# Patient Record
Sex: MALE | Race: WHITE | Employment: FULL TIME | URBAN - METROPOLITAN AREA
[De-identification: names, ages, dates, MRNs, and addresses within clinical notes are randomized per-mention and may not be internally consistent; named-entity substitution may affect disease eponyms.]

---

## 2017-01-30 ENCOUNTER — OFFICE VISIT (OUTPATIENT)
Dept: FAMILY MEDICINE CLINIC | Age: 27
End: 2017-01-30

## 2017-01-30 VITALS
WEIGHT: 210 LBS | TEMPERATURE: 98.3 F | SYSTOLIC BLOOD PRESSURE: 123 MMHG | OXYGEN SATURATION: 97 % | RESPIRATION RATE: 18 BRPM | BODY MASS INDEX: 30.06 KG/M2 | HEIGHT: 70 IN | HEART RATE: 87 BPM | DIASTOLIC BLOOD PRESSURE: 77 MMHG

## 2017-01-30 DIAGNOSIS — J06.9 VIRAL URI WITH COUGH: Primary | ICD-10-CM

## 2017-01-30 LAB
QUICKVUE INFLUENZA TEST: NEGATIVE
VALID INTERNAL CONTROL?: YES

## 2017-01-30 RX ORDER — GUAIFENESIN, PSEUDOEPHEDRINE HYDROCHLORIDE 600; 60 MG/1; MG/1
1 TABLET, EXTENDED RELEASE ORAL EVERY 12 HOURS
Qty: 14 TAB | Refills: 0 | Status: SHIPPED | OUTPATIENT
Start: 2017-01-30 | End: 2017-02-27

## 2017-01-30 NOTE — PATIENT INSTRUCTIONS
Cough: Care Instructions  Your Care Instructions  A cough is your body's response to something that bothers your throat or airways. Many things can cause a cough. You might cough because of a cold or the flu, bronchitis, or asthma. Smoking, postnasal drip, allergies, and stomach acid that backs up into your throat also can cause coughs. A cough is a symptom, not a disease. Most coughs stop when the cause, such as a cold, goes away. You can take a few steps at home to cough less and feel better. Follow-up care is a key part of your treatment and safety. Be sure to make and go to all appointments, and call your doctor if you are having problems. It's also a good idea to know your test results and keep a list of the medicines you take. How can you care for yourself at home? · Drink lots of water and other fluids. This helps thin the mucus and soothes a dry or sore throat. Honey or lemon juice in hot water or tea may ease a dry cough. · Take cough medicine as directed by your doctor. · Prop up your head on pillows to help you breathe and ease a dry cough. · Try cough drops to soothe a dry or sore throat. Cough drops don't stop a cough. Medicine-flavored cough drops are no better than candy-flavored drops or hard candy. · Do not smoke. Avoid secondhand smoke. If you need help quitting, talk to your doctor about stop-smoking programs and medicines. These can increase your chances of quitting for good. When should you call for help? Call 911 anytime you think you may need emergency care. For example, call if:  · You have severe trouble breathing. Call your doctor now or seek immediate medical care if:  · You cough up blood. · You have new or worse trouble breathing. · You have a new or higher fever. · You have a new rash.   Watch closely for changes in your health, and be sure to contact your doctor if:  · You cough more deeply or more often, especially if you notice more mucus or a change in the color of your mucus. · You have new symptoms, such as a sore throat, an earache, or sinus pain. · You do not get better as expected. Where can you learn more? Go to http://nikhil-tika.info/. Enter D279 in the search box to learn more about \"Cough: Care Instructions. \"  Current as of: May 27, 2016  Content Version: 11.1  © 1369-7719 Rent[employer]. Care instructions adapted under license by [employer] (which disclaims liability or warranty for this information). If you have questions about a medical condition or this instruction, always ask your healthcare professional. [address] any warranty or liability for your use of this information.

## 2017-01-30 NOTE — MR AVS SNAPSHOT
Visit Information Date & Time Provider Department Dept. Phone Encounter #  
 1/30/2017  8:45 AM Piper Lucio MD [address] [place] [address] [phone] [id number] Follow-up Instructions Return if symptoms worsen or fail to improve. Upcoming Health Maintenance Date Due DTaP/Tdap/Td series (1 - Tdap) 6/17/2009 INFLUENZA AGE 9 TO ADULT 8/1/2016 Allergies as of 1/30/2017  Review Complete On: 1/30/2017 By: Piper Lucio MD  
 No Known Allergies Current Immunizations  Reviewed on 1/30/2017 No immunizations on file. Reviewed by Piper Lucio MD on 1/30/2017 at  8:59 AM  
You Were Diagnosed With   
  
 Codes Comments Viral URI with cough    -  Primary ICD-10-CM: J06.9, B97.89 ICD-9-CM: 465.9 Vitals BP Pulse Temp Resp Height(growth percentile) Weight(growth percentile) 123/77 (BP 1 Location: Left arm, BP Patient Position: Sitting) 87 98.3 °F (36.8 °C) (Oral) 18 5' 10\" (1.778 m) 210 lb (95.3 kg) SpO2 BMI Smoking Status 97% 30.13 kg/m2 Never Smoker Vitals History BMI and BSA Data Body Mass Index Body Surface Area  
 30.13 kg/m 2 2.17 m 2 Preferred Pharmacy Pharmacy Name Phone [id number] [address] [facility] [phone] Your Updated Medication List  
  
   
This list is accurate as of: 1/30/17  9:26 AM.  Always use your most recent med list.  
  
  
  
  
 HYDROcodone-acetaminophen 5-325 mg per tablet Commonly known as:  Samson Crockett Take 1-2 Tabs by mouth every six (6) hours as needed for Pain. Max Daily Amount: 8 Tabs. ibuprofen 200 mg tablet Commonly known as:  MOTRIN Take 3 Tabs by mouth every six (6) hours as needed for Pain. methylphenidate 36 mg CR tablet Commonly known as:  CONCERTA Take 1 Tab by mouth daily. Max Daily Amount: 36 mg. 2 tabs in am daily PSEUDOEPHEDRINE-guaiFENesin  mg per tablet Commonly known as:  Rishi Husk D Take 1 Tab by mouth every twelve (12) hours. traMADol 50 mg tablet Commonly known as:  ULTRAM  
Take 1 Tab by mouth every six (6) hours as needed for Pain. Max Daily Amount: 200 mg.  
  
 TUMS EXTRA STRENGTH SMOOTHIES 300 mg (750 mg) chewable tablet Generic drug:  calcium carbonate Take 1 Tab by mouth daily. Prescriptions Sent to Pharmacy Refills PSEUDOEPHEDRINE-guaiFENesin (MUCINEX D)  mg per tablet 0 Sig: Take 1 Tab by mouth every twelve (12) hours. Class: Normal  
 Pharmacy: [address] [place] [address] Ph #: [phone] Route: Oral  
  
We Performed the Following AMB POC RAPID INFLUENZA TEST [59441 CPT(R)] Follow-up Instructions Return if symptoms worsen or fail to improve. Patient Instructions Cough: Care Instructions Your Care Instructions A cough is your body's response to something that bothers your throat or airways. Many things can cause a cough. You might cough because of a cold or the flu, bronchitis, or asthma. Smoking, postnasal drip, allergies, and stomach acid that backs up into your throat also can cause coughs. A cough is a symptom, not a disease. Most coughs stop when the cause, such as a cold, goes away. You can take a few steps at home to cough less and feel better. Follow-up care is a key part of your treatment and safety. Be sure to make and go to all appointments, and call your doctor if you are having problems. It's also a good idea to know your test results and keep a list of the medicines you take. How can you care for yourself at home? · Drink lots of water and other fluids. This helps thin the mucus and soothes a dry or sore throat. Honey or lemon juice in hot water or tea may ease a dry cough. · Take cough medicine as directed by your doctor. · Prop up your head on pillows to help you breathe and ease a dry cough. · Try cough drops to soothe a dry or sore throat. Cough drops don't stop a cough. Medicine-flavored cough drops are no better than candy-flavored drops or hard candy. · Do not smoke. Avoid secondhand smoke. If you need help quitting, talk to your doctor about stop-smoking programs and medicines. These can increase your chances of quitting for good. When should you call for help? Call 911 anytime you think you may need emergency care. For example, call if: 
· You have severe trouble breathing. Call your doctor now or seek immediate medical care if: 
· You cough up blood. · You have new or worse trouble breathing. · You have a new or higher fever. · You have a new rash. Watch closely for changes in your health, and be sure to contact your doctor if: 
· You cough more deeply or more often, especially if you notice more mucus or a change in the color of your mucus. · You have new symptoms, such as a sore throat, an earache, or sinus pain. · You do not get better as expected. Where can you learn more? Go to http://nikhil-tika.info/. Enter D279 in the search box to learn more about \"Cough: Care Instructions. \" Current as of: May 27, 2016 Content Version: 11.1 © 7555-2055 [employer]. Care instructions adapted under license by [employer] (which disclaims liability or warranty for this information). If you have questions about a medical condition or this instruction, always ask your healthcare professional. [place] [address] any warranty or liability for your use of this information. Introducing [facility] HE[facility]! [employer] introduces [employer] patient portal. Now you can access parts of your medical record, email your doctor's office, and request medication refills online. 1. In your internet browser, go to [email or web address] 2. Click on the First Time User? Click Here link in the Sign In box. You will see the New Member Sign Up page. 3. Enter your SocialTagg Access Code exactly as it appears below. You will not need to use this code after youve completed the sign-up process. If you do not sign up before the expiration date, you must request a new code. · SocialTagg Access Code: [email or web address] Expires: 4/30/2017  9:02 AM 
 
4. Enter the last four digits of your Social Security Number (xxxx) and Date of Birth (mm/dd/yyyy) as indicated and click Submit. You will be taken to the next sign-up page. 5. Create a SocialTagg ID. This will be your SocialTagg login ID and cannot be changed, so think of one that is secure and easy to remember. 6. Create a SocialTagg password. You can change your password at any time. 7. Enter your Password Reset Question and Answer. This can be used at a later time if you forget your password. 8. Enter your e-mail address. You will receive e-mail notification when new information is available in [address] 9. Click Sign Up. You can now view and download portions of your medical record. 10. Click the Download Summary menu link to download a portable copy of your medical information. If you have questions, please visit the Frequently Asked Questions section of the SocialTagg website. Remember, SocialTagg is NOT to be used for urgent needs. For medical emergencies, dial 911. Now available from your iPhone and Android! Please provide this summary of care documentation to your next provider. Your primary care clinician is listed as Dori Shafer. If you have any questions after today's visit, please call [phone].

## 2017-01-30 NOTE — PROGRESS NOTES
Tien Mccracken is an 27 y.o. male who presents for cough. The cough started 5 days ago, productive with small amount of clear sputum, nasal congestion, sore throat as well. Also had subjective fever and chills 3 days ago. Denies ear pain, headache, nausea, vomiting, rash. Tried Mucinex Dm and Advil with minimal relief. Non smoker. No hx of asthma. Works as a teacher. Many sick contacts around. No Flu shot this year. Allergies - reviewed:   No Known Allergies      Medications - reviewed:   Current Outpatient Prescriptions   Medication Sig    PSEUDOEPHEDRINE-guaiFENesin (MUCINEX D)  mg per tablet Take 1 Tab by mouth every twelve (12) hours.  calcium carbonate (TUMS EXTRA STRENGTH SMOOTHIES) 300 mg (750 mg) chewable tablet Take 1 Tab by mouth daily.  methylphenidate ER 36 mg 24 hr tab Take 1 Tab by mouth daily. Max Daily Amount: 36 mg. 2 tabs in am daily    HYDROcodone-acetaminophen (NORCO) 5-325 mg per tablet Take 1-2 Tabs by mouth every six (6) hours as needed for Pain. Max Daily Amount: 8 Tabs.  ibuprofen (MOTRIN) 200 mg tablet Take 3 Tabs by mouth every six (6) hours as needed for Pain.  traMADol (ULTRAM) 50 mg tablet Take 1 Tab by mouth every six (6) hours as needed for Pain. Max Daily Amount: 200 mg. No current facility-administered medications for this visit. Past Medical History - reviewed:  Past Medical History   Diagnosis Date    ADHD (attention deficit hyperactivity disorder) 1997     neuro-evaluation done. on concerta.  ADHD (attention deficit hyperactivity disorder)          Past Surgical History - reviewed:   Past Surgical History   Procedure Laterality Date    Hx wisdom teeth extraction           Social History - reviewed:  Social History     Social History    Marital status:      Spouse name: N/A    Number of children: N/A    Years of education: N/A     Occupational History    Not on file.      Social History Main Topics    Smoking status: Never Smoker    Smokeless tobacco: Never Used    Alcohol use 0.6 - 1.2 oz/week     1 - 2 Cans of beer per week    Drug use: No    Sexual activity: Yes     Partners: Female     Other Topics Concern    Not on file     Social History Narrative         Family History - reviewed:  Family History   Problem Relation Age of Onset    Tremors Mother     Arthritis-osteo Mother     Cancer Father     Lung Disease Father     Elevated Lipids Maternal Grandmother     Diabetes Maternal Grandmother     Arthritis-osteo Maternal Grandmother     Stroke Maternal Grandfather     Arthritis-osteo Paternal Grandmother     Elevated Lipids Paternal Grandfather          Immunizations - reviewed: There is no immunization history on file for this patient. ROS  Review of Systems : negative unless highlighted  CONSTITUTIONAL: fevers. Chills. Anorexia. Weight loss. Weight gain. EYES: diplopia. Blurry vision. Visual changes  ENT: sinus congestion. Cough. Sore throat. Rhinorrhea. CARDIOVASCULAR: chest pain. palpitations  RESPIRATORY: dyspnea. Cough. Wheeze. SKIN: rash. Itching. Dryness. Flushing. Physical Exam  Visit Vitals    /77 (BP 1 Location: Left arm, BP Patient Position: Sitting)    Pulse 87    Temp 98.3 °F (36.8 °C) (Oral)    Resp 18    Ht 5' 10\" (1.778 m)    Wt 210 lb (95.3 kg)    SpO2 97%    BMI 30.13 kg/m2       General appearance - NAD. Appropriately conversational, intermittently coughing during the conversation. HENT: TMs clear, external canals without redness. Oral mucosa moist. + Postnasal drainage. Throat slightly erythematous. Neck: Supple. No lymphadenopathy. Respiratory - LCTAB. No wheeze/rale/rhonchi  Heart - Normal rate, regular rhythm. No m/r/r  Skin - normal coloration and normal turgor. No cyanosis, no rash.        Assessment/Plan    Viral URI with cough  - Rx for Mucinex D was given  -Encouraged oral hydration with warm tea  -The pt was reassured about having viral infection instead of bactearial      Rapid influenza test is negative    Follow-up Disposition:  Return if symptoms worsen or fail to improve. I discussed the aforementioned diagnoses with the patient as well as the plan of care.      The patient was seen and discussed with Dr. Lizbeth Marsh ( The attending physician)    Fei Simpson MD  Family Medicine Resident  PGY 1

## 2017-01-30 NOTE — PROGRESS NOTES
Chief Complaint   Patient presents with    Chills     chills x 2 days     Cough     cough x 5 days      1. Have you been to the ER, urgent care clinic since your last visit? Hospitalized since your last visit? No    2. Have you seen or consulted any other health care providers outside of the [address] [place] [address] since your last visit? Include any pap smears or colon screening. No     Reviewed record in preparation for visit and have obtained necessary documentation.

## 2017-02-27 ENCOUNTER — OFFICE VISIT (OUTPATIENT)
Dept: FAMILY MEDICINE CLINIC | Age: 27
End: 2017-02-27

## 2017-02-27 VITALS
HEART RATE: 100 BPM | WEIGHT: 204.6 LBS | HEIGHT: 70 IN | RESPIRATION RATE: 16 BRPM | DIASTOLIC BLOOD PRESSURE: 81 MMHG | SYSTOLIC BLOOD PRESSURE: 126 MMHG | BODY MASS INDEX: 29.29 KG/M2 | OXYGEN SATURATION: 96 % | TEMPERATURE: 98.5 F

## 2017-02-27 DIAGNOSIS — J06.9 VIRAL URI WITH COUGH: ICD-10-CM

## 2017-02-27 DIAGNOSIS — F90.9 ADULT ADHD: Primary | ICD-10-CM

## 2017-02-27 RX ORDER — METHYLPHENIDATE HYDROCHLORIDE 36 MG/1
72 TABLET ORAL DAILY
Qty: 60 TAB | Refills: 0 | Status: SHIPPED | OUTPATIENT
Start: 2017-02-27 | End: 2017-02-27

## 2017-02-27 RX ORDER — METHYLPHENIDATE HYDROCHLORIDE 36 MG/1
72 TABLET ORAL DAILY
Qty: 60 TAB | Refills: 0 | Status: SHIPPED | OUTPATIENT
Start: 2017-03-30 | End: 2017-04-29

## 2017-02-27 RX ORDER — METHYLPHENIDATE HYDROCHLORIDE 36 MG/1
72 TABLET ORAL DAILY
Qty: 60 TAB | Refills: 0 | Status: SHIPPED | OUTPATIENT
Start: 2017-02-27 | End: 2017-03-29

## 2017-02-27 RX ORDER — BENZONATATE 200 MG/1
200 CAPSULE ORAL
Qty: 21 CAP | Refills: 1 | Status: SHIPPED | OUTPATIENT
Start: 2017-02-27 | End: 2017-03-06

## 2017-02-27 RX ORDER — METHYLPHENIDATE HYDROCHLORIDE 36 MG/1
72 TABLET ORAL DAILY
Qty: 60 TAB | Refills: 0 | Status: SHIPPED | OUTPATIENT
Start: 2017-04-30 | End: 2017-05-30

## 2017-02-27 NOTE — PROGRESS NOTES
Chief Complaint   Patient presents with    Medication Refill     concerta    Cough     for a month

## 2017-02-27 NOTE — MR AVS SNAPSHOT
Visit Information Date & Time Provider Department Dept. Phone Encounter #  
 2/27/2017  3:50 PM Dori Shafer MD [facility] [phone] [id number] Upcoming Health Maintenance Date Due DTaP/Tdap/Td series (1 - Tdap) 6/17/2009 INFLUENZA AGE 9 TO ADULT 8/1/2016 Allergies as of 2/27/2017  Review Complete On: 2/27/2017 By: Dori Shafer MD  
 No Known Allergies Current Immunizations  Reviewed on 1/30/2017 No immunizations on file. Not reviewed this visit You Were Diagnosed With   
  
 Codes Comments Adult ADHD    -  Primary ICD-10-CM: F90.0 ICD-9-CM: 314.01 Viral URI with cough     ICD-10-CM: J06.9, B97.89 ICD-9-CM: 465.9 Vitals BP  
  
  
  
  
  
 126/81 (BP 1 Location: Right arm, BP Patient Position: Sitting) Vitals History BMI and BSA Data Body Mass Index Body Surface Area  
 29.36 kg/m 2 2.14 m 2 Preferred Pharmacy Pharmacy Name Phone [id number] [place] [facility] [phone] Your Updated Medication List  
  
   
This list is accurate as of: 2/27/17  4:30 PM.  Always use your most recent med list.  
  
  
  
  
 benzonatate 200 mg capsule Commonly known as:  TESSALON Take 1 Cap by mouth three (3) times daily as needed for Cough for up to 7 days. ibuprofen 200 mg tablet Commonly known as:  MOTRIN Take 3 Tabs by mouth every six (6) hours as needed for Pain. * methylphenidate 36 mg CR tablet Commonly known as:  CONCERTA Take 2 Tabs (72 mg total) by mouth daily. 2 tabs in am daily. Max Daily Amount: 72 mg  
  
 * methylphenidate 36 mg CR tablet Commonly known as:  CONCERTA Take 2 Tabs (72 mg total) by mouth dailyEarliest Fill Date: 3/30/17. Max Daily Amount: 72 mg Start taking on:  3/30/2017 * methylphenidate 36 mg CR tablet Commonly known as:  CONCERTA Take 2 Tabs (72 mg total) by mouth dailyEarliest Fill Date: 4/30/17. Max Daily Amount: 72 mg Start taking on:  4/30/2017 PSEUDOEPHEDRINE-guaiFENesin  mg per tablet Commonly known as:  Jodie Jqauelin D Take 1 Tab by mouth every twelve (12) hours. TUMS EXTRA STRENGTH SMOOTHIES 300 mg (750 mg) chewable tablet Generic drug:  calcium carbonate Take 1 Tab by mouth daily. * Notice: This list has 3 medication(s) that are the same as other medications prescribed for you. Read the directions carefully, and ask your doctor or other care provider to review them with you. Prescriptions Printed Refills  
 methylphenidate ER 36 mg 24 hr tab 0 Sig: Take 2 Tabs (72 mg total) by mouth daily. 2 tabs in am daily. Max Daily Amount: 72 mg Class: Print Route: Oral  
 methylphenidate ER 36 mg 24 hr tab 0 Starting on: 3/30/2017 Sig: Take 2 Tabs (72 mg total) by mouth dailyEarliest Fill Date: 3/30/17. Max Daily Amount: 72 mg Class: Print Route: Oral  
 methylphenidate ER 36 mg 24 hr tab 0 Starting on: 4/30/2017 Sig: Take 2 Tabs (72 mg total) by mouth dailyEarliest Fill Date: 4/30/17. Max Daily Amount: 72 mg Class: Print Route: Oral  
  
Prescriptions Sent to Pharmacy Refills  
 benzonatate (TESSALON) 200 mg capsule 1 Sig: Take 1 Cap by mouth three (3) times daily as needed for Cough for up to 7 days. Class: Normal  
 Pharmacy: [address] [place] [address] Ph #: [phone] Route: Oral  
  
Patient Instructions Benzonatate (By mouth) Benzonatate (dci-MPZ-gf-de la fuente) Treats cough. Brand Name(s):Manjinder Ball There may be other brand names for this medicine. When This Medicine Should Not Be Used: You should not use this medicine if you have had an allergic reaction to benzonatate in the past.  
How to Use This Medicine:  
Capsule, Liquid Filled Capsule · Your doctor will tell you how much medicine to take and how often. · Swallow the capsules whole; do not crush or chew. Chewing the capsule may numb your mouth and throat and you could choke. If a dose is missed: · Take the missed dose as soon as possible. · If it is almost time for the next dose, wait until then to take your medicine and skip the missed dose. · You should not use two doses at the same time. How to Store and Dispose of This Medicine:  
· Keep this medication in the original tightly closed, light-resistant container. · Store at room temperature, away from heat, moisture, and light. · Ask your pharmacist, doctor, or health caregiver about the best way to dispose of any outdated medicine or medicine no longer needed. · Keep all medicine away from children. Drugs and Foods to Avoid: Ask your doctor or pharmacist before using any other medicine, including over-the-counter medicines, vitamins, and herbal products. · Avoid drinking alcohol while taking this medicine. Warnings While Using This Medicine: · If you are pregnant or breastfeeding, talk to your doctor before taking this medicine. · Benzonatate is not recommended for children younger than 8years old. Possible Side Effects While Using This Medicine:  
Call your doctor right away if you notice any of these side effects: · Trouble breathing · Tightness in the chest or throat · Tremors, shakiness, seizures · Skin rash, itching If you notice these less serious side effects, talk with your doctor: · Nausea, upset stomach 
· Headache · Mild dizziness · Drowsiness · Constipation · Stuffy nose If you notice other side effects that you think are caused by this medicine, tell your doctor. Call your doctor for medical advice about side effects. You may report side effects to FDA at 4-545-FDA-3445 © 2016 [address] is for End User's use only and may not be sold, redistributed or otherwise used for commercial purposes. The above information is an  only. It is not intended as medical advice for individual conditions or treatments. Talk to your doctor, nurse or pharmacist before following any medical regimen to see if it is safe and effective for you. Introducing [facility] & HEALTH SERVICES! Clarisa Neal introduces Vendor[employer] patient portal. Now you can access parts of your medical record, email your doctor's office, and request medication refills online. 1. In your internet browser, go to [email or web address] 2. Click on the First Time User? Click Here link in the Sign In box. You will see the New Member Sign Up page. 3. Enter your VendorStack Access Code exactly as it appears below. You will not need to use this code after youve completed the sign-up process. If you do not sign up before the expiration date, you must request a new code. · VendorStack Access Code: [email or web address] Expires: 4/30/2017  9:02 AM 
 
4. Enter the last four digits of your Social Security Number (xxxx) and Date of Birth (mm/dd/yyyy) as indicated and click Submit. You will be taken to the next sign-up page. 5. Create a VendorStack ID. This will be your VendorStack login ID and cannot be changed, so think of one that is secure and easy to remember. 6. Create a VendorStack password. You can change your password at any time. 7. Enter your Password Reset Question and Answer. This can be used at a later time if you forget your password. 8. Enter your e-mail address. You will receive e-mail notification when new information is available in [address] 9. Click Sign Up. You can now view and download portions of your medical record. 10. Click the Download Summary menu link to download a portable copy of your medical information.  
 
If you have questions, please visit the Frequently Asked Questions section of the [employer]. Remember, [employer]rus Lane[employer] is NOT to be used for urgent needs. For medical emergencies, dial 911. Now available from your iPhone and Android! Please provide this summary of care documentation to your next provider. Your primary care clinician is listed as Gonzalo Trujillo. If you have any questions after today's visit, please call [phone].

## 2017-02-27 NOTE — PROGRESS NOTES
Subjective:      Addy Rubio is an 27 y.o. male who presents for evaluation of ADD and cough. ADD  Working as a teacher. Symptoms well controlled.   States that he has good appetite, no palpitations, sleeps well  He has been taking concerta 83BS every day. He signed up with our clinic for medication contract. Cough  Onset: 3-4 weeks ago  Frequency : intermittent  Progress : improved  Accompanied Symptoms : no  Pain : no  Tried mucinex and sudafed but not helpful for coughing. Review of Systems   Constitutional: Negative for chills, fever, malaise/fatigue and weight loss. HENT: Negative for ear pain. Eyes: Negative for blurred vision. Respiratory: Positive for cough. Negative for sputum production and shortness of breath. Cardiovascular: Negative for chest pain. Skin: Negative for itching and rash. Neurological: Negative for headaches. Past Medical History:   Diagnosis Date    ADHD (attention deficit hyperactivity disorder) 1997    neuro-evaluation done. on concerta.  ADHD (attention deficit hyperactivity disorder)      Past Surgical History:   Procedure Laterality Date    HX WISDOM TEETH EXTRACTION       Current Outpatient Prescriptions   Medication Sig Dispense Refill    calcium carbonate (TUMS EXTRA STRENGTH SMOOTHIES) 300 mg (750 mg) chewable tablet Take 1 Tab by mouth daily.  methylphenidate ER 36 mg 24 hr tab Take 1 Tab by mouth daily. Max Daily Amount: 36 mg. 2 tabs in am daily 60 Tab 2    PSEUDOEPHEDRINE-guaiFENesin (MUCINEX D)  mg per tablet Take 1 Tab by mouth every twelve (12) hours. 14 Tab 0    ibuprofen (MOTRIN) 200 mg tablet Take 3 Tabs by mouth every six (6) hours as needed for Pain.  90 Tab 1     No Known Allergies  Family History   Problem Relation Age of Onset    Tremors Mother     Arthritis-osteo Mother     Cancer Father     Lung Disease Father     Elevated Lipids Maternal Grandmother     Diabetes Maternal Grandmother     Elena Monroy Maternal Grandmother     Stroke Maternal Grandfather     Arthritis-osteo Paternal Grandmother     Elevated Lipids Paternal Grandfather      Social History     Social History    Marital status:      Spouse name: N/A    Number of children: N/A    Years of education: N/A     Occupational History    Not on file. Social History Main Topics    Smoking status: Never Smoker    Smokeless tobacco: Never Used    Alcohol use 0.6 - 1.2 oz/week     1 - 2 Cans of beer per week    Drug use: No    Sexual activity: Yes     Partners: Female     Other Topics Concern    Not on file     Social History Narrative           Objective:     Visit Vitals    /81 (BP 1 Location: Right arm, BP Patient Position: Sitting)    Pulse 100    Temp 98.5 °F (36.9 °C) (Oral)    Resp 16    Ht 5' 10\" (1.778 m)    Wt 204 lb 9.6 oz (92.8 kg)    SpO2 96%    BMI 29.36 kg/m2         Physical Exam:  General: calm, relaxed, appears nondistressed   Skin: no rashes or lesions noted, no erythema, ecchymoses, or petechiae, no urticaria   HEENT:  normocephalic/atraumatic, no facial droop, PERLLA/EOMI, pharynx clear,  speech clear and fluent. Conjunctivae/Cornea clear. Neck:  supple, no meningismus, no thyromegaly or thyroid tenderness, no detectable cervical bruit, and no JVD  Supple, FROM, normal appearance, symmetrical, trachea midline. No palpable adenopathy   Chest Wall: no tenderness or deformity. Lungs:  normal symmetric expansion - clear to auscultation, no stridor, rales, rhonchi, or wheezes heard   Cardiac:  regular rate and rhythm, normal S1/S2, no obvious murmur, gallop, or rub, PMI appears nondisplaced         Assessment / Plan:     1. Adult ADHD  Will refill concerta today. Recommend to follow up with neuropsych to re-evaluate. - methylphenidate ER 36 mg 24 hr tab; Take 2 Tabs (72 mg total) by mouth daily. 2 tabs in am daily. Max Daily Amount: 72 mg  Dispense: 60 Tab; Refill: 0    2.  Viral URI with cough  Symptomatic treatment. Lung sound clear on exam. Afebrile. - benzonatate (TESSALON) 200 mg capsule; Take 1 Cap by mouth three (3) times daily as needed for Cough for up to 7 days. Dispense: 21 Cap; Refill: 1      AVS was printed, given to patient and briefly discussed prior to patient's departure from the office today.      Poonam Krueger MD  [facility] Medicine Resident  [address] [place] [address]  Madelyn Vaz

## 2017-10-13 ENCOUNTER — OFFICE VISIT (OUTPATIENT)
Dept: FAMILY MEDICINE CLINIC | Age: 27
End: 2017-10-13

## 2017-10-13 VITALS
HEIGHT: 70 IN | WEIGHT: 214 LBS | OXYGEN SATURATION: 96 % | SYSTOLIC BLOOD PRESSURE: 132 MMHG | HEART RATE: 78 BPM | DIASTOLIC BLOOD PRESSURE: 82 MMHG | BODY MASS INDEX: 30.64 KG/M2 | RESPIRATION RATE: 16 BRPM | TEMPERATURE: 99 F

## 2017-10-13 DIAGNOSIS — F90.2 ATTENTION DEFICIT HYPERACTIVITY DISORDER (ADHD), COMBINED TYPE: Primary | ICD-10-CM

## 2017-10-13 RX ORDER — METHYLPHENIDATE HYDROCHLORIDE 36 MG/1
36 TABLET ORAL
Qty: 30 TAB | Refills: 0 | Status: SHIPPED | OUTPATIENT
Start: 2017-12-08 | End: 2018-01-09 | Stop reason: SDUPTHER

## 2017-10-13 RX ORDER — METHYLPHENIDATE HYDROCHLORIDE 36 MG/1
36 TABLET ORAL
COMMUNITY
End: 2017-10-13 | Stop reason: SDUPTHER

## 2017-10-13 RX ORDER — METHYLPHENIDATE HYDROCHLORIDE 36 MG/1
36 TABLET ORAL
Qty: 30 TAB | Refills: 0 | Status: SHIPPED | OUTPATIENT
Start: 2017-11-10 | End: 2017-10-13 | Stop reason: SDUPTHER

## 2017-10-13 RX ORDER — METHYLPHENIDATE HYDROCHLORIDE 36 MG/1
36 TABLET ORAL
Qty: 30 TAB | Refills: 0 | Status: SHIPPED | OUTPATIENT
Start: 2017-10-13 | End: 2017-10-13 | Stop reason: SDUPTHER

## 2017-10-13 NOTE — MR AVS SNAPSHOT
Visit Information Date & Time Provider Department Dept. Phone Encounter #  
 10/13/2017 10:20 AM Charlotte Hernandez, [facility] [phone] [id number] Upcoming Health Maintenance Date Due DTaP/Tdap/Td series (1 - Tdap) 6/17/2009 INFLUENZA AGE 9 TO ADULT 8/1/2017 Allergies as of 10/13/2017  Review Complete On: 10/13/2017 By: Gil Diaz LPN No Known Allergies Current Immunizations  Reviewed on 1/30/2017 No immunizations on file. Not reviewed this visit You Were Diagnosed With   
  
 Codes Comments Attention deficit hyperactivity disorder (ADHD), combined type    -  Primary ICD-10-CM: F90.2 ICD-9-CM: 314.01 Vitals BP Pulse Temp Resp Height(growth percentile) Weight(growth percentile) 132/82 (BP 1 Location: Left arm, BP Patient Position: Sitting) 78 99 °F (37.2 °C) (Oral) 16 5' 10\" (1.778 m) 214 lb (97.1 kg) SpO2 BMI Smoking Status 96% 30.71 kg/m2 Never Smoker Vitals History BMI and BSA Data Body Mass Index Body Surface Area 30.71 kg/m 2 2.19 m 2 Preferred Pharmacy Pharmacy Name Phone [id number] [facility] [phone] Your Updated Medication List  
  
   
This list is accurate as of: 10/13/17 10:43 AM.  Always use your most recent med list.  
  
  
  
  
 ibuprofen 200 mg tablet Commonly known as:  MOTRIN Take 3 Tabs by mouth every six (6) hours as needed for Pain. methylphenidate HCl 36 mg CR tablet Commonly known as:  CONCERTA Take 1 Tab (36 mg total) by mouth every morningEarliest Fill Date: 12/8/17. Max Daily Amount: 36 mg  
Start taking on:  12/8/2017 TUMS EXTRA STRENGTH SMOOTHIES 300 mg (750 mg) chewable tablet Generic drug:  calcium carbonate Take 1 Tab by mouth daily. Prescriptions Printed  Refills  
 methylphenidate ER 36 mg 24 hr tab 0  
 Starting on: 12/8/2017 Sig: Take 1 Tab (36 mg total) by mouth every morningEarliest Fill Date: 12/8/17. Max Daily Amount: 36 mg  
 Class: Print Route: Oral  
  
Patient Instructions Please establish with a counselor for further refills. Have information faxed to our office. [address] [address] [address] [place], 
[address] [place] [address] Avenue 
[phone] Dr. Kristen Mnoge [facility] [place] [address] [facility] [address] [facility] [address] [facility] Phone: [phone] Fax: [phone] Introducing [facility]! Dear [employer]: Thank you for requesting a [employer] account. Our records indicate that you already have an active [employer] account. You can access your account anytime at [email or web address],". [employer]/"[employer]," Did you know that you can access your hospital and ER discharge instructions at any time in [employer]? You can also review all of your test results from your hospital stay or ER visit. Additional Information If you have questions, please visit the Frequently Asked Questions section of the [employer] website at [email or web address],". [employer]/"[employer],"/. Remember, [employer] is NOT to be used for urgent needs. For medical emergencies, dial 911. Now available from your iPhone and Android! Please provide this summary of care documentation to your next provider. Your primary care clinician is listed as Smita Patel. If you have any questions after today's visit, please call [phone].

## 2017-10-13 NOTE — PATIENT INSTRUCTIONS
Please establish with a counselor for further refills. Have information faxed to our office. [place] [employer]    [place] [address]  [address] [facility] [address]  [phone]    Dr. Halima Steel [facility]  [facility] [address]  Phone: [phone]  Fax: [phone]

## 2017-10-13 NOTE — PROGRESS NOTES
Subjective  Dariana Alvarez is an 32 y.o. male who presents for methylphenidate refill for ADHD. He has been seen at our office for the same. He denies seeking therapy or counseling to help manage symptoms of inattentiveness or hyperactivity. Diagnosed at 6years old. Has been on several medications but methylphenidate has worked well for several years. Has no long term goals in place to wean off the medication. Past Medical History - reviewed:  Past Medical History:   Diagnosis Date    ADHD (attention deficit hyperactivity disorder) 1997    neuro-evaluation done. on concerta.  ADHD (attention deficit hyperactivity disorder)          ROS  CONSTITUTIONAL: no fever  CARDIOVASCULAR: no chest pain  RESPIRATORY: no shortness of breath      Physical Exam  Visit Vitals    /82 (BP 1 Location: Left arm, BP Patient Position: Sitting)    Pulse 78    Temp 99 °F (37.2 °C) (Oral)    Resp 16    Ht 5' 10\" (1.778 m)    Wt 214 lb (97.1 kg)    SpO2 96%    BMI 30.71 kg/m2       General appearance - alert, well appearing, and in no distress  Chest - clear to auscultation, no wheezes or rhonchi, symmetric air entry  Heart - normal rate, regular rhythm, normal S1, S2, no murmurs  Abdomen - soft, nontender, nondistended  Neurological - alert, oriented, normal speech, no focal findings or movement disorder noted  Skin - normal coloration and turgor      Assessment/Plan    ICD-10-CM ICD-9-CM    1. Attention deficit hyperactivity disorder (ADHD), combined type F90.2 314.01 REFERRAL TO NEUROPSYCHOLOGY     ADHD: [facility]  consistent. Gave 3 month supply. Counseled on establishing long term goals. AVS information with counselors and Neuropsych follow up. Advised patient to see counselor and have information faxed to our office. Patient informed that further refills will not be provided unless appropriate follow up is initiated. He is in agreement.     Follow-up Disposition:  Return in about 3 months (around 1/13/2018). I have discussed the diagnosis with the patient and the intended plan as seen in the above orders. The patient has received an after-visit summary and questions were answered concerning future plans. I have discussed medication side effects and warnings with the patient as well.       Lorene Garcia MD  Family Medicine Resident

## 2018-01-08 ENCOUNTER — TELEPHONE (OUTPATIENT)
Dept: FAMILY MEDICINE CLINIC | Age: 28
End: 2018-01-08

## 2018-01-08 NOTE — TELEPHONE ENCOUNTER
Patient states he have always taken 2 pills a day and not 1 pill. Patient states he was given a 30 day supply and need to be 60 day supply.       methylphenidate ER 36 mg 24 hr tab [[id number]]   Order Details   Dose: 36 mg Route: Oral Frequency: 7AM   Dispense Quantity:  30 Tab Refills:  0 Fills Remaining:  --           Sig: Take 1 Tab (36 mg total) by mouth every morningEarliest Fill Date: 12/8/17.  Max Daily Amount: 36 mg          Written Date:  10/13/17 Expiration Date:  --     Start Date:  12/08/17 End Date:  --            Ordering Provider:  -- RANI #:  -- NPI:  --    Authorizing Provider:  Jose Maria Aleman MD RANI #:  [id number] NPI:  [id number]    Ordering User:  Jose Maria Aleman MD               Original Order:  methylphenidate ER 36 mg 24 hr tab [[id number]]      Pharmacy:  [facility] [place] #:  [id number]     Pharmacy Comments:  --          Quantity Remaining:  -- Quantity Filled:  --

## 2018-01-09 DIAGNOSIS — F90.9 ATTENTION DEFICIT HYPERACTIVITY DISORDER (ADHD), UNSPECIFIED ADHD TYPE: Primary | ICD-10-CM

## 2018-01-09 RX ORDER — METHYLPHENIDATE HYDROCHLORIDE 36 MG/1
72 TABLET ORAL DAILY
Qty: 60 TAB | Refills: 0 | Status: SHIPPED | OUTPATIENT
Start: 2018-01-09 | End: 2018-09-04 | Stop reason: SDUPTHER

## 2018-01-09 RX ORDER — METHYLPHENIDATE HYDROCHLORIDE 36 MG/1
72 TABLET ORAL DAILY
Qty: 60 TAB | Refills: 0 | Status: SHIPPED | OUTPATIENT
Start: 2018-02-09 | End: 2018-02-05 | Stop reason: SDUPTHER

## 2018-02-05 ENCOUNTER — OFFICE VISIT (OUTPATIENT)
Dept: FAMILY MEDICINE CLINIC | Age: 28
End: 2018-02-05

## 2018-02-05 VITALS
OXYGEN SATURATION: 97 % | RESPIRATION RATE: 20 BRPM | WEIGHT: 222.8 LBS | TEMPERATURE: 102.9 F | BODY MASS INDEX: 31.9 KG/M2 | DIASTOLIC BLOOD PRESSURE: 86 MMHG | HEIGHT: 70 IN | SYSTOLIC BLOOD PRESSURE: 125 MMHG | HEART RATE: 100 BPM

## 2018-02-05 DIAGNOSIS — J02.9 SORE THROAT: ICD-10-CM

## 2018-02-05 DIAGNOSIS — R50.9 FEVER, UNSPECIFIED FEVER CAUSE: ICD-10-CM

## 2018-02-05 DIAGNOSIS — R52 BODY ACHES: ICD-10-CM

## 2018-02-05 DIAGNOSIS — J06.9 UPPER RESPIRATORY TRACT INFECTION, UNSPECIFIED TYPE: Primary | ICD-10-CM

## 2018-02-05 RX ORDER — OSELTAMIVIR PHOSPHATE 75 MG/1
75 CAPSULE ORAL 2 TIMES DAILY
Qty: 10 CAP | Refills: 0 | Status: SHIPPED | OUTPATIENT
Start: 2018-02-05 | End: 2018-02-10

## 2018-02-05 NOTE — MR AVS SNAPSHOT
[address] 
[phone] Patient: Karly Kang MRN: [id number] JEQ:[id number] Visit Information Date & Time Provider Department Dept. Phone Encounter #  
 2/5/2018  3:05 PM Talya Richard MD [address] [place] [address] [phone] [id number] Follow-up Instructions Return if symptoms worsen or fail to improve. Your Appointments 7/2/2018 10:40 AM  
New Patient with Mahogany Spencer PsyD [address] [facility] ([address]) Appt Note: evaluate for ADHD ref by Dr. Alisa Guzman [phone]) $40CP cc yd 01/08/18 [place] [address] [facility] [address] [facility] [address] [id number] [phone]  
  
   
 [place] [address] [id number] [address] Upcoming Health Maintenance Date Due DTaP/Tdap/Td series (1 - Tdap) 6/17/2009 Influenza Age 5 to Adult 8/1/2017 Allergies as of 2/5/2018  Review Complete On: 2/5/2018 By: Talya Richard MD  
 No Known Allergies Current Immunizations  Reviewed on 1/30/2017 No immunizations on file. Not reviewed this visit You Were Diagnosed With   
  
 Codes Comments Upper respiratory tract infection, unspecified type    -  Primary ICD-10-CM: J06.9 ICD-9-CM: 465.9 Sore throat     ICD-10-CM: J02.9 ICD-9-CM: 743 Body aches     ICD-10-CM: R52 ICD-9-CM: 780.96 Fever, unspecified fever cause     ICD-10-CM: R50.9 ICD-9-CM: 780.60 Vitals BP Pulse Temp Resp Height(growth percentile) Weight(growth percentile) 125/86 (BP 1 Location: Left arm, BP Patient Position: Sitting) 100 (!) 102.9 °F (39.4 °C) (Oral) 20 5' 10\" (1.778 m) 222 lb 12.8 oz (101.1 kg) SpO2 BMI Smoking Status 97% 31.97 kg/m2 Never Smoker BMI and BSA Data Body Mass Index Body Surface Area  
 31.97 kg/m 2 2.23 m 2 Preferred Pharmacy Pharmacy Name Phone [facility] #[id number]- [address] AT [facility] [phone] Your Updated Medication List  
  
   
This list is accurate as of: 2/5/18  3:21 PM.  Always use your most recent med list.  
  
  
  
  
 ibuprofen 200 mg tablet Commonly known as:  MOTRIN Take 3 Tabs by mouth every six (6) hours as needed for Pain. * methylphenidate HCl 36 mg CR tablet Commonly known as:  CONCERTA Take 2 Tabs (72 mg total) by mouth dailyEarliest Fill Date: 1/9/18. Max Daily Amount: 72 mg  
  
 * methylphenidate HCl 36 mg CR tablet Commonly known as:  CONCERTA Take 2 Tabs (72 mg total) by mouth dailyEarliest Fill Date: 2/9/18. Max Daily Amount: 72 mg Start taking on:  2/9/2018  
  
 oseltamivir 75 mg capsule Commonly known as:  TAMIFLU Take 1 Cap by mouth two (2) times a day for 5 days. TUMS EXTRA STRENGTH SMOOTHIES 300 mg (750 mg) chewable tablet Generic drug:  calcium carbonate Take 1 Tab by mouth daily. * Notice: This list has 2 medication(s) that are the same as other medications prescribed for you. Read the directions carefully, and ask your doctor or other care provider to review them with you. Prescriptions Sent to Pharmacy Refills  
 oseltamivir (TAMIFLU) 75 mg capsule 0 Sig: Take 1 Cap by mouth two (2) times a day for 5 days. Class: Normal  
 Pharmacy: [address] [place] [address] #: [phone] Route: Oral  
  
Follow-up Instructions Return if symptoms worsen or fail to improve. Patient Instructions Learning About Fever What is a fever? A fever is a high body temperature. It's one way your body fights being sick. A fever shows that the body is responding to infection or other illnesses, both minor and severe. A fever is a symptom, not an illness by itself. A fever can be a sign that you are ill, but most fevers are not caused by a serious problem. You may have a fever with a minor illness, such as a cold. But sometimes a very serious infection may cause little or no fever. It is important to look at other symptoms, other conditions you have, and how you feel in general. In children, notice how they act and see what symptoms they complain of. What is a normal body temperature? A normal body temperature is about 98. 6ºF. Some people have a normal temperature that is a little higher or a little lower than this. Your temperature may be a little lower in the morning than it is later in the day. It may go up during hot weather or when you exercise, wear heavy clothes, or take a hot bath. Your temperature may also be different depending on how you take it. A temperature taken in the mouth (oral) or under the arm may be a little lower than your core temperature (rectal). What is a fever temperature? A core temperature of 100.4°F or above is considered a fever. What can cause a fever? A fever may be caused by: · Infections. This is the most common cause of a fever. Examples of infections that can cause a fever include the flu, a kidney infection, or pneumonia. · Some medicines. · Severe trauma or injury, such as a heart attack, stroke, heatstroke, or burns. · Other medical conditions, such as arthritis and some cancers. How can you treat a fever at home? · Ask your doctor if you can take an over-the-counter pain medicine, such as acetaminophen (Tylenol), ibuprofen (Advil, Motrin), or naproxen (Aleve). Be safe with medicines. Read and follow all instructions on the label. · To prevent dehydration, drink plenty of fluids. Choose water and other caffeine-free clear liquids until you feel better. If you have kidney, heart, or liver disease and have to limit fluids, talk with your doctor before you increase the amount of fluids you drink. Follow-up care is a key part of your treatment and safety.  Be sure to make and go to all appointments, and call your doctor if you are having problems. It's also a good idea to know your test results and keep a list of the medicines you take. Where can you learn more? Go to http://nikhil-tika.info/. Enter Z534 in the search box to learn more about \"Learning About Fever. \" Current as of: March 20, 2017 Content Version: 11.4 © 2840-6321 [email or web address]. Care instructions adapted under license by [employer] (which disclaims liability or warranty for this information). If you have questions about a medical condition or this instruction, always ask your healthcare professional. [address] any warranty or liability for your use of this information. Sore Throat: Care Instructions Your Care Instructions Infection by bacteria or a virus causes most sore throats. Cigarette smoke, dry air, air pollution, allergies, and yelling can also cause a sore throat. Sore throats can be painful and annoying. Fortunately, most sore throats go away on their own. If you have a bacterial infection, your doctor may prescribe antibiotics. Follow-up care is a key part of your treatment and safety. Be sure to make and go to all appointments, and call your doctor if you are having problems. It's also a good idea to know your test results and keep a list of the medicines you take. How can you care for yourself at home? · If your doctor prescribed antibiotics, take them as directed. Do not stop taking them just because you feel better. You need to take the full course of antibiotics. · Gargle with warm salt water once an hour to help reduce swelling and relieve discomfort. Use 1 teaspoon of salt mixed in 1 cup of warm water. · Take an over-the-counter pain medicine, such as acetaminophen (Tylenol), ibuprofen (Advil, Motrin), or naproxen (Aleve). Read and follow all instructions on the label. · Be careful when taking over-the-counter cold or flu medicines and Tylenol at the same time. Many of these medicines have acetaminophen, which is Tylenol. Read the labels to make sure that you are not taking more than the recommended dose. Too much acetaminophen (Tylenol) can be harmful. · Drink plenty of fluids. Fluids may help soothe an irritated throat. Hot fluids, such as tea or soup, may help decrease throat pain. · Use over-the-counter throat lozenges to soothe pain. Regular cough drops or hard candy may also help. These should not be given to young children because of the risk of choking. · Do not smoke or allow others to smoke around you. If you need help quitting, talk to your doctor about stop-smoking programs and medicines. These can increase your chances of quitting for good. · Use a vaporizer or humidifier to add moisture to your bedroom. Follow the directions for cleaning the machine. When should you call for help? Call your doctor now or seek immediate medical care if: 
? · You have new or worse trouble swallowing. ? · Your sore throat gets much worse on one side. ? Watch closely for changes in your health, and be sure to contact your doctor if you do not get better as expected. Where can you learn more? Go to http://nikhil-tika.info/. Enter [id number] [id number] in the search box to learn more about \"Sore Throat: Care Instructions. \" Current as of: May 12, 2017 Content Version: 11.4 © 2421-5192 HDmessaging. Care instructions adapted under license by [employer] (which disclaims liability or warranty for this information). If you have questions about a medical condition or this instruction, always ask your healthcare professional. [place] [address] any warranty or liability for your use of this information. Influenza (Flu): Care Instructions Your Care Instructions Influenza (flu) is an infection in the lungs and breathing passages. It is caused by the influenza virus. There are different strains, or types, of the flu virus from year to year. Unlike the common cold, the flu comes on suddenly and the symptoms, such as a cough, congestion, fever, chills, fatigue, aches, and pains, are more severe. These symptoms may last up to 10 days. Although the flu can make you feel very sick, it usually doesn't cause serious health problems. Home treatment is usually all you need for flu symptoms. But your doctor may prescribe antiviral medicine to prevent other health problems, such as pneumonia, from developing. Older people and those who have a long-term health condition, such as lung disease, are most at risk for having pneumonia or other health problems. Follow-up care is a key part of your treatment and safety. Be sure to make and go to all appointments, and call your doctor if you are having problems. It's also a good idea to know your test results and keep a list of the medicines you take. How can you care for yourself at home? · Get plenty of rest. 
· Drink plenty of fluids, enough so that your urine is light yellow or clear like water. If you have kidney, heart, or liver disease and have to limit fluids, talk with your doctor before you increase the amount of fluids you drink. · Take an over-the-counter pain medicine if needed, such as acetaminophen (Tylenol), ibuprofen (Advil, Motrin), or naproxen (Aleve), to relieve fever, headache, and muscle aches. Read and follow all instructions on the label. No one younger than 20 should take aspirin. It has been linked to Reye syndrome, a serious illness. · Do not smoke. Smoking can make the flu worse. If you need help quitting, talk to your doctor about stop-smoking programs and medicines. These can increase your chances of quitting for good.  
· Breathe moist air from a hot shower or from a sink filled with hot water to help clear a stuffy nose. · Before you use cough and cold medicines, check the label. These medicines may not be safe for young children or for people with certain health problems. · If the skin around your nose and lips becomes sore, put some petroleum jelly on the area. · To ease coughing: ¨ Drink fluids to soothe a scratchy throat. ¨ Suck on cough drops or plain hard candy. ¨ Take an over-the-counter cough medicine that contains dextromethorphan to help you get some sleep. Read and follow all instructions on the label. ¨ Raise your head at night with an extra pillow. This may help you rest if coughing keeps you awake. · Take any prescribed medicine exactly as directed. Call your doctor if you think you are having a problem with your medicine. To avoid spreading the flu · Wash your hands regularly, and keep your hands away from your face. · Stay home from school, work, and other public places until you are feeling better and your fever has been gone for at least 24 hours. The fever needs to have gone away on its own without the help of medicine. · Ask people living with you to talk to their doctors about preventing the flu. They may get antiviral medicine to keep from getting the flu from you. · To prevent the flu in the future, get a flu vaccine every fall. Encourage people living with you to get the vaccine. · Cover your mouth when you cough or sneeze. When should you call for help? Call 911 anytime you think you may need emergency care. For example, call if: 
? · You have severe trouble breathing. ?Call your doctor now or seek immediate medical care if: 
? · You have new or worse trouble breathing. ? · You seem to be getting much sicker. ? · You feel very sleepy or confused. ? · You have a new or higher fever. ? · You get a new rash. ? Watch closely for changes in your health, and be sure to contact your doctor if: 
? · You begin to get better and then get worse. ? · You are not getting better after 1 week. Where can you learn more? Go to http://nikhil-tika.info/. Enter W336 in the search box to learn more about \"Influenza (Flu): Care Instructions. \" Current as of: May 12, 2017 Content Version: 11.4 © 9807-7355 DineroMail. Care instructions adapted under license by [email or web address] (which disclaims liability or warranty for this information). If you have questions about a medical condition or this instruction, always ask your healthcare professional. [address] any warranty or liability for your use of this information. Introducing [facility]! Dear Sabina Buckner: Thank you for requesting a Seismo-Shelf account. Our records indicate that you already have an active Seismo-Shelf account. You can access your account anytime at [email or web address] Did you know that you can access your hospital and ER discharge instructions at any time in Seismo-Shelf? You can also review all of your test results from your hospital stay or ER visit. Additional Information If you have questions, please visit the Frequently Asked Questions section of the Seismo-Shelf website at [email or web address] Remember, Seismo-Shelf is NOT to be used for urgent needs. For medical emergencies, dial 911. Now available from your iPhone and Android! Please provide this summary of care documentation to your next provider. Your primary care clinician is listed as Kelsie Murillo. If you have any questions after today's visit, please call [phone].

## 2018-02-05 NOTE — PATIENT INSTRUCTIONS
Learning About Fever  What is a fever? A fever is a high body temperature. It's one way your body fights being sick. A fever shows that the body is responding to infection or other illnesses, both minor and severe. A fever is a symptom, not an illness by itself. A fever can be a sign that you are ill, but most fevers are not caused by a serious problem. You may have a fever with a minor illness, such as a cold. But sometimes a very serious infection may cause little or no fever. It is important to look at other symptoms, other conditions you have, and how you feel in general. In children, notice how they act and see what symptoms they complain of. What is a normal body temperature? A normal body temperature is about 98. 6ºF. Some people have a normal temperature that is a little higher or a little lower than this. Your temperature may be a little lower in the morning than it is later in the day. It may go up during hot weather or when you exercise, wear heavy clothes, or take a hot bath. Your temperature may also be different depending on how you take it. A temperature taken in the mouth (oral) or under the arm may be a little lower than your core temperature (rectal). What is a fever temperature? A core temperature of 100.4°F or above is considered a fever. What can cause a fever? A fever may be caused by:  · Infections. This is the most common cause of a fever. Examples of infections that can cause a fever include the flu, a kidney infection, or pneumonia. · Some medicines. · Severe trauma or injury, such as a heart attack, stroke, heatstroke, or burns. · Other medical conditions, such as arthritis and some cancers. How can you treat a fever at home? · Ask your doctor if you can take an over-the-counter pain medicine, such as acetaminophen (Tylenol), ibuprofen (Advil, Motrin), or naproxen (Aleve). Be safe with medicines. Read and follow all instructions on the label.   · To prevent dehydration, drink plenty of fluids. Choose water and other caffeine-free clear liquids until you feel better. If you have kidney, heart, or liver disease and have to limit fluids, talk with your doctor before you increase the amount of fluids you drink. Follow-up care is a key part of your treatment and safety. Be sure to make and go to all appointments, and call your doctor if you are having problems. It's also a good idea to know your test results and keep a list of the medicines you take. Where can you learn more? Go to http://nikhil-tika.info/. Enter K817 in the search box to learn more about \"Learning About Fever. \"  Current as of: March 20, 2017  Content Version: 11.4  © 4493-8017 [employer]. Care instructions adapted under license by [employer] (which disclaims liability or warranty for this information). If you have questions about a medical condition or this instruction, always ask your healthcare professional. [place] [address] any warranty or liability for your use of this information. Sore Throat: Care Instructions  Your Care Instructions    Infection by bacteria or a virus causes most sore throats. Cigarette smoke, dry air, air pollution, allergies, and yelling can also cause a sore throat. Sore throats can be painful and annoying. Fortunately, most sore throats go away on their own. If you have a bacterial infection, your doctor may prescribe antibiotics. Follow-up care is a key part of your treatment and safety. Be sure to make and go to all appointments, and call your doctor if you are having problems. It's also a good idea to know your test results and keep a list of the medicines you take. How can you care for yourself at home? · If your doctor prescribed antibiotics, take them as directed. Do not stop taking them just because you feel better. You need to take the full course of antibiotics.   · Gargle with warm salt water once an hour to help reduce swelling and relieve discomfort. Use 1 teaspoon of salt mixed in 1 cup of warm water. · Take an over-the-counter pain medicine, such as acetaminophen (Tylenol), ibuprofen (Advil, Motrin), or naproxen (Aleve). Read and follow all instructions on the label. · Be careful when taking over-the-counter cold or flu medicines and Tylenol at the same time. Many of these medicines have acetaminophen, which is Tylenol. Read the labels to make sure that you are not taking more than the recommended dose. Too much acetaminophen (Tylenol) can be harmful. · Drink plenty of fluids. Fluids may help soothe an irritated throat. Hot fluids, such as tea or soup, may help decrease throat pain. · Use over-the-counter throat lozenges to soothe pain. Regular cough drops or hard candy may also help. These should not be given to young children because of the risk of choking. · Do not smoke or allow others to smoke around you. If you need help quitting, talk to your doctor about stop-smoking programs and medicines. These can increase your chances of quitting for good. · Use a vaporizer or humidifier to add moisture to your bedroom. Follow the directions for cleaning the machine. When should you call for help? Call your doctor now or seek immediate medical care if:  ? · You have new or worse trouble swallowing. ? · Your sore throat gets much worse on one side. ? Watch closely for changes in your health, and be sure to contact your doctor if you do not get better as expected. Where can you learn more? Go to http://nikhil-tika.info/. Enter [id number] [id number] in the search box to learn more about \"Sore Throat: Care Instructions. \"  Current as of: May 12, 2017  Content Version: 11.4  © 3399-4751 [employer]. Care instructions adapted under license by [employer] (which disclaims liability or warranty for this information).  If you have questions about a medical condition or this instruction, always ask your healthcare professional. [place] [address] any warranty or liability for your use of this information. Influenza (Flu): Care Instructions  Your Care Instructions    Influenza (flu) is an infection in the lungs and breathing passages. It is caused by the influenza virus. There are different strains, or types, of the flu virus from year to year. Unlike the common cold, the flu comes on suddenly and the symptoms, such as a cough, congestion, fever, chills, fatigue, aches, and pains, are more severe. These symptoms may last up to 10 days. Although the flu can make you feel very sick, it usually doesn't cause serious health problems. Home treatment is usually all you need for flu symptoms. But your doctor may prescribe antiviral medicine to prevent other health problems, such as pneumonia, from developing. Older people and those who have a long-term health condition, such as lung disease, are most at risk for having pneumonia or other health problems. Follow-up care is a key part of your treatment and safety. Be sure to make and go to all appointments, and call your doctor if you are having problems. It's also a good idea to know your test results and keep a list of the medicines you take. How can you care for yourself at home? · Get plenty of rest.  · Drink plenty of fluids, enough so that your urine is light yellow or clear like water. If you have kidney, heart, or liver disease and have to limit fluids, talk with your doctor before you increase the amount of fluids you drink. · Take an over-the-counter pain medicine if needed, such as acetaminophen (Tylenol), ibuprofen (Advil, Motrin), or naproxen (Aleve), to relieve fever, headache, and muscle aches. Read and follow all instructions on the label. No one younger than 20 should take aspirin. It has been linked to Reye syndrome, a serious illness. · Do not smoke. Smoking can make the flu worse.  If you need help quitting, talk to your doctor about stop-smoking programs and medicines. These can increase your chances of quitting for good. · Breathe moist air from a hot shower or from a sink filled with hot water to help clear a stuffy nose. · Before you use cough and cold medicines, check the label. These medicines may not be safe for young children or for people with certain health problems. · If the skin around your nose and lips becomes sore, put some petroleum jelly on the area. · To ease coughing:  ¨ Drink fluids to soothe a scratchy throat. ¨ Suck on cough drops or plain hard candy. ¨ Take an over-the-counter cough medicine that contains dextromethorphan to help you get some sleep. Read and follow all instructions on the label. ¨ Raise your head at night with an extra pillow. This may help you rest if coughing keeps you awake. · Take any prescribed medicine exactly as directed. Call your doctor if you think you are having a problem with your medicine. To avoid spreading the flu  · Wash your hands regularly, and keep your hands away from your face. · Stay home from school, work, and other public places until you are feeling better and your fever has been gone for at least 24 hours. The fever needs to have gone away on its own without the help of medicine. · Ask people living with you to talk to their doctors about preventing the flu. They may get antiviral medicine to keep from getting the flu from you. · To prevent the flu in the future, get a flu vaccine every fall. Encourage people living with you to get the vaccine. · Cover your mouth when you cough or sneeze. When should you call for help? Call 911 anytime you think you may need emergency care. For example, call if:  ? · You have severe trouble breathing. ?Call your doctor now or seek immediate medical care if:  ? · You have new or worse trouble breathing. ? · You seem to be getting much sicker. ? · You feel very sleepy or confused.    ? · You have a new or higher fever. ? · You get a new rash. ? Watch closely for changes in your health, and be sure to contact your doctor if:  ? · You begin to get better and then get worse. ? · You are not getting better after 1 week. Where can you learn more? Go to http://nikhil-tika.info/. Enter R565 in the search box to learn more about \"Influenza (Flu): Care Instructions. \"  Current as of: May 12, 2017  Content Version: 11.4  © 8841-3611 [employer]. Care instructions adapted under license by [employer] (which disclaims liability or warranty for this information). If you have questions about a medical condition or this instruction, always ask your healthcare professional. [address] any warranty or liability for your use of this information.

## 2018-02-05 NOTE — PROGRESS NOTES
Subjective:   Adri Nguyen is an 32 y.o. male who presents for fever and chills. HPI  Started to have sore throat associated with fever, chills and body aches since yesterday. T max at 102.0 yesterday. He was taking OTC Advil which broke the fever. No nasal congestion, no nasal drainage. No nausea, no emesis. Tolerates normal PO fluid intake. No know Influenza exposure. Allergies - reviewed:   No Known Allergies      Medications - reviewed:  Current Outpatient Prescriptions   Medication Sig    oseltamivir (TAMIFLU) 75 mg capsule Take 1 Cap by mouth two (2) times a day for 5 days.  methylphenidate ER 36 mg 24 hr tab Take 2 Tabs (72 mg total) by mouth dailyEarliest Fill Date: 1/9/18. Max Daily Amount: 72 mg    ibuprofen (MOTRIN) 200 mg tablet Take 3 Tabs by mouth every six (6) hours as needed for Pain.  calcium carbonate (TUMS EXTRA STRENGTH SMOOTHIES) 300 mg (750 mg) chewable tablet Take 1 Tab by mouth daily. No current facility-administered medications for this visit. Review of Systems   CONSTITUTIONAL: + Fevers and chills   ENT: + Sore throat, denies sinus congestion. Denies sinus drainage  CARDIOVASCULAR: denies chest pain.  Denies palpitations  RESPIRATORY: denies shortness of breath      Objective:     Visit Vitals    /86 (BP 1 Location: Left arm, BP Patient Position: Sitting)    Pulse 100    Temp (!) 102.9 °F (39.4 °C) (Oral)    Resp 20    Ht 5' 10\" (1.778 m)    Wt 222 lb 12.8 oz (101.1 kg)    SpO2 97%    BMI 31.97 kg/m2       General appearance - alert, well appearing, and in no distress  Nose - normal and patent, no erythema, discharge or polyps  Mouth - mucous membranes moist,+ Slightly erythematous pharynx but no tonsillar exudates, No postnasal drip  Neck - supple, no significant adenopathy  Chest - clear to auscultation, no wheezes, rales or rhonchi, symmetric air entry  Heart - normal rate, regular rhythm, normal S1, S2, no murmurs, rubs, clicks or gallops    Rapid Strep is negative. Assessment:   33 yo male who is here for:    ICD-10-CM ICD-9-CM    1. Upper respiratory tract infection, unspecified type J06.9 465.9 oseltamivir (TAMIFLU) 75 mg capsule   2. Sore throat J02.9 462    3. Body aches R52 780.96    4. Fever, unspecified fever cause R50.9 780.60            Plan:   · I suspect the Influenza infection based on the patient symptoms and high fever in the clinic. No testing for Influenza is available in the clinic. Will treat for influenza based on the symptoms as the pt is having symptoms < 48 hours  · Will treat with Tamiflu x 5 days  · OTC Tylenol prn body aches  · Warm tea with honey for sore throat  · Well hydration with fluids     Follow-up Disposition:  Return if symptoms worsen or fail to improve. I have discussed the diagnosis with the patient and the intended plan as seen in the above orders. The patient has received an after-visit summary and questions were answered concerning future plans. I have discussed medication side effects and warnings with the patient as well. Informed pt to return to the office if new symptoms arise.       Law Mahajan MD  Family Medicine Resident, PGY-2

## 2018-02-05 NOTE — LETTER
NOTIFICATION RETURN TO WORK  
 
2/5/2018 3:24 PM 
 
Mr. Florence Mcdaniel 
[address] [facility] [address] [id number] To Whom It May Concern: 
 
Florence Mcdaniel is currently under the care of [facility]. He will return to work/ on: Wednesday, February 7, 2018 If there are questions or concerns please have the patient contact our office. Sincerely, Vanesa Harvey MD

## 2018-02-05 NOTE — PROGRESS NOTES
1. Have you been to the ER, urgent care clinic since your last visit? Hospitalized since your last visit? No    2. Have you seen or consulted any other health care providers outside of the [address] [place] [address] since your last visit? Include any pap smears or colon screening. No  Chief Complaint   Patient presents with    Chills     times 1 day    Cough     times 1 day    Fever     Per patient, does not get vaccines.

## 2018-07-02 ENCOUNTER — OFFICE VISIT (OUTPATIENT)
Dept: NEUROLOGY | Age: 28
End: 2018-07-02

## 2018-07-02 DIAGNOSIS — F43.22 ADJUSTMENT DISORDER WITH ANXIETY: Primary | ICD-10-CM

## 2018-07-02 DIAGNOSIS — Z86.59 HISTORY OF ADHD: ICD-10-CM

## 2018-07-02 DIAGNOSIS — R41.840 INATTENTION: ICD-10-CM

## 2018-07-02 NOTE — PROGRESS NOTES
[address] [facility],[address]  [address]   [place] [address] [facility] [address] [facility]   [place] [address]   [phone] Office   [phone] Fax      Neuropsychology    Initial Diagnostic Interview Note      Referral:  Preethi Lau MD,  Kimani Zarate is a 25 y.o. right handed   male who was unaccompanied to the initial clinical interview on 7/2/18 . Please refer to his medical records for details pertaining to his history. 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 He completed a Master's Degree in Clarinet Performance. He is the  in [facility] [employer]. He previously worked in [employer]. He was diagnosed with ADHD in school. He was diagnosed at age 6 and had testing done then in the third grade. Saw Dr. Daniel Dickson in [place]. He was having a hard time focusing. He was acting out a lot more in class. Was diagnosed then and began treatment at that time. He would take medication during the day and Ritalin at night also. Continues to struggle with focus and attention. Difficulties completing task. Difficulties completing things -takes a long time. Had a 504 Plan in high school and got extra time to complete tasks and assignments and tests and such. Notices ongoing problmes with attention and focus impacting functioning at home and at work. He has been on Concerta ER which he takes during the week (36 mg bid). He is juggling a lot during the day. Has long hours at work. It is helpful. Has not had testing done as an adult. Neurologic history is negative. Medically, he is mostly fine. Two years ago, he had a herniated disc in his back and had to have surgery. Couldn't move his right leg. That has slowed down his movement a bit. Otherwise healthy. Sleep and appetite is okay. He has not been as good about working out. Some stress. No major psychiatric history.   No counseling or psychiatrist. No meds for mood. In general, he is a nervous person. Gets easily anxious. Was nervous about coming in today - something new, and wants to have the perfect outcome. The anxiety amplifies a sense of perfectionism. No known family history of ADHD type concerns. One of his cousins has a large history of psychiatric problems requiring residential care and treatment. He had testing as a child, but not as an adult. Neuropsychological Mental Status Exam (NMSE):  Historian: Good  Praxis: No UE apraxia  R/L Orientation: Intact to self and to other  Dress: within normal limits   Weight: within normal limits   Appearance/Hygiene: within normal limits   Gait: within normal limits   Assistive Devices: None  Mood: within normal limits   Affect: within normal limits   Comprehension: within normal limits   Thought Process: within normal limits   Expressive Language: within normal limits   Receptive Language: within normal limits   Motor:  No cognitive or motor perseveration  ETOH: Beer once a day (1)  Tobacco: Denied  Illicit: Denied  SI/HI: Denied  Psychosis: Denied  Insight: Within normal limits  Judgment: Within normal limits  Other Psych:      Past Medical History:   Diagnosis Date    ADHD (attention deficit hyperactivity disorder) 1997    neuro-evaluation done. on concerta.      ADHD (attention deficit hyperactivity disorder)        Past Surgical History:   Procedure Laterality Date    HX WISDOM TEETH EXTRACTION         No Known Allergies    Family History   Problem Relation Age of Onset    Tremors Mother     Arthritis-osteo Mother     Cancer Father     Lung Disease Father     Elevated Lipids Maternal Grandmother     Diabetes Maternal Grandmother     Arthritis-osteo Maternal Grandmother     Stroke Maternal Grandfather     Arthritis-osteo Paternal Grandmother     Elevated Lipids Paternal Grandfather        Social History   Substance Use Topics    Smoking status: Never Smoker    Smokeless tobacco: Never Used    Alcohol use 0.6 - 1.2 oz/week     1 - 2 Cans of beer per week       Current Outpatient Prescriptions   Medication Sig Dispense Refill    methylphenidate ER 36 mg 24 hr tab Take 2 Tabs (72 mg total) by mouth dailyEarliest Fill Date: 1/9/18. Max Daily Amount: 72 mg 60 Tab 0    ibuprofen (MOTRIN) 200 mg tablet Take 3 Tabs by mouth every six (6) hours as needed for Pain. 90 Tab 1    calcium carbonate (TUMS EXTRA STRENGTH SMOOTHIES) 300 mg (750 mg) chewable tablet Take 1 Tab by mouth daily. Plan:  Obtain authorization for testing from insurance company. Report to follow once testing, scoring, and interpretation completed. ? Organic based neurocognitive issues versus mood disorder or combination of same. ? Problems organic, functional, or both? This note will not be viewable in [address] 25year old with cognitive concerns as noted above as well as emotional concerns and ? As to organic based cognitive concerns such as an attention disorder versus mood (functional versus organic) or combination of same?

## 2018-07-12 ENCOUNTER — OFFICE VISIT (OUTPATIENT)
Dept: NEUROLOGY | Age: 28
End: 2018-07-12

## 2018-07-12 DIAGNOSIS — F43.22 ADJUSTMENT DISORDER WITH ANXIETY: Primary | ICD-10-CM

## 2018-07-12 DIAGNOSIS — F90.0 ATTENTION DEFICIT HYPERACTIVITY DISORDER (ADHD), INATTENTIVE TYPE, MILD: ICD-10-CM

## 2018-07-12 NOTE — PROGRESS NOTES
[address] [facility],[address]  [address]   [place] [address] [facility] [address] [facility] [place] [address]   [phone] Office   [phone] Fax      Psychological Evaluation Report  Referral:  Rodrigo Beard MD    Sanjuana Ash is a 25 y.o. right handed   male who was unaccompanied to the initial clinical interview on 7/2/18 . Please refer to his medical records for details pertaining to his history. 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 He completed a Master's Degree in Clarinet Performance. He is the  in [facility]. He previously worked in [employer]. He was diagnosed with ADHD in school. He was diagnosed at age 6 and had testing done then in the third grade. Saw Dr. Selena Freeman in [place]. He was having a hard time focusing. He was acting out a lot more in class. Was diagnosed then and began treatment at that time. He would take medication during the day and Ritalin at night also. Continues to struggle with focus and attention. Difficulties completing task. Difficulties completing things -takes a long time. Had a 504 Plan in high school and got extra time to complete tasks and assignments and tests and such. Notices ongoing problmes with attention and focus impacting functioning at home and at work. He has been on Concerta ER which he takes during the week (36 mg bid). He is juggling a lot during the day. Has long hours at work. It is helpful. Has not had testing done as an adult. Neurologic history is negative. Medically, he is mostly fine. Two years ago, he had a herniated disc in his back and had to have surgery. Couldn't move his right leg. That has slowed down his movement a bit. Otherwise healthy. Sleep and appetite is okay. He has not been as good about working out. Some stress. No major psychiatric history. No counseling or psychiatrist. No meds for mood.   In general, he is a nervous person. Gets easily anxious. Was nervous about coming in today - something new, and wants to have the perfect outcome. The anxiety amplifies a sense of perfectionism. No known family history of ADHD type concerns. One of his cousins has a large history of psychiatric problems requiring residential care and treatment. He had testing as a child, but not as an adult. Neuropsychological Mental Status Exam (NMSE):  Historian: Good  Praxis: No UE apraxia  R/L Orientation: Intact to self and to other  Dress: within normal limits   Weight: within normal limits   Appearance/Hygiene: within normal limits   Gait: within normal limits   Assistive Devices: None  Mood: within normal limits   Affect: within normal limits   Comprehension: within normal limits   Thought Process: within normal limits   Expressive Language: within normal limits   Receptive Language: within normal limits   Motor:  No cognitive or motor perseveration  ETOH: Beer once a day (1)  Tobacco: Denied  Illicit: Denied  SI/HI: Denied  Psychosis: Denied  Insight: Within normal limits  Judgment: Within normal limits  Other Psych:      Past Medical History:   Diagnosis Date    ADHD (attention deficit hyperactivity disorder) 1997    neuro-evaluation done. on concerta.      ADHD (attention deficit hyperactivity disorder)        Past Surgical History:   Procedure Laterality Date    HX WISDOM TEETH EXTRACTION         No Known Allergies    Family History   Problem Relation Age of Onset    Tremors Mother     Arthritis-osteo Mother     Cancer Father     Lung Disease Father     Elevated Lipids Maternal Grandmother     Diabetes Maternal Grandmother     Arthritis-osteo Maternal Grandmother     Stroke Maternal Grandfather     Arthritis-osteo Paternal Grandmother     Elevated Lipids Paternal Grandfather        Social History   Substance Use Topics    Smoking status: Never Smoker    Smokeless tobacco: Never Used    Alcohol use 0.6 - 1.2 oz/week     1 - 2 Cans of beer per week       Current Outpatient Prescriptions   Medication Sig Dispense Refill    methylphenidate ER 36 mg 24 hr tab Take 2 Tabs (72 mg total) by mouth dailyEarliest Fill Date: 1/9/18. Max Daily Amount: 72 mg 60 Tab 0    ibuprofen (MOTRIN) 200 mg tablet Take 3 Tabs by mouth every six (6) hours as needed for Pain. 90 Tab 1    calcium carbonate (TUMS EXTRA STRENGTH SMOOTHIES) 300 mg (750 mg) chewable tablet Take 1 Tab by mouth daily. Plan:  Obtain authorization for testing from insurance company. Report to follow once testing, scoring, and interpretation completed. ? Organic based neurocognitive issues versus mood disorder or combination of same. ? Problems organic, functional, or both? This note will not be viewable in [address] 25year old with cognitive concerns as noted above as well as emotional concerns and ? As to organic based cognitive concerns such as an attention disorder versus mood (functional versus organic) or combination of same? Psychological Evaluation  Patient Testing 7/12/18 Report Completed 7/12/18  A Psychometrist Assisted w/ portions of this evaluation while under my direct  supervision    Neuropsychologist Administered, Interpreted, & Reported: Neuropsychological Mental Status Exam, Revised Memory & Behavior Checklist, MMSE, Clock Drawing, Test Of Premorbid Functioning, Doreen Poet Adult ADD Scales, Helen-Melzack Pain Questionnaire, History Taking  & Clinical Interview With The Patient*, Review Of Available Records*.     Psychometrist Administered & Neuropsychologist Interpreted & Neuropsychologist Reported: Speech-Sounds Perception Test, ELIZABETH, Paced Serial Addition Test, Wechsler Adult Intelligence Scale - IV, Verbal Fluency Tests, Negrito & Negrito - Revised, Trailmaking Test Parts A & B, Buschke Selective Reminding Test, Boston Complex Figure Test, Beck Depression Inventory - II, Beck Anxiety Inventory, Personality Assessment Inventory,    Computer Administered & Neuropsychologist Interpreted & Neuropsychologist Reported: Juliet Continuous Performance Test - III      Test Findings:  Note:  The patients raw data have been compared with currently available norms which include demographic corrections for age, gender, and/or education. Sometimes, the patients scores are compared to demographically similar individuals as close to the patients age, education level, etc., as possible. \"Average\" is viewed as being +/- 1 standard deviation (SD) from the stated mean for a particular test score. \"Low average\" is viewed as being between 1 and 2 SD below the mean, and above average is viewed as being 1 and 2 SD above the mean. Scores falling in the borderline range (between 1-1/2 and 2 SD below the mean) are viewed with particular attention as to whether they are normal or abnormal neurocognitive test scores. Other methods of inference in analyzing the test data are also utilized, including the pattern and range of scores in the profile, bilateral motor functions, and the presence, if any, of pathognomonic signs. Behaviorally, the patient was friendly and cooperative and appeared motivated to perform well during this examination. Within this context, the results of this evaluation are viewed as a valid reflection of the patients actual neurocognitive and emotional status. The patient's self-reported score of 65 on the Pala Bumpers Adult ADD Scales was within the elevated risk range for ADHD related concerns. His structured word list fluency, as assessed by the FAS Test, was within the above average range with a T score of 56. Category fluency was within the above average range with a T Score of 72. Confrontation naming, as assessed by the [facility] - Revised, was within the average range at 58/60 correct (T = 52).   This pattern of performance is not indicative of a patient who is at increased risk for day-to-day problems with verbal fluency and/or confrontation naming. The patient was administered the [facility] Continuous Performance Test-III and review of the subscales within this instrument revealed mild concern for inattentiveness without concern for impulsivity. Verbal auditory attention (T = 36) was mildly impaired. Nonverbal auditory attention and discrimination, as assessed by the ELIZABETH, also revealed mild concern for inattentiveness without impulsivity. High level auditory information processing speed, as assessed by the PASAT, was within the impaired range for Trial 2 (- 1.61 SD). This pattern of performance is indicative of a patient who is at increased risk for day-to-day problems with sustained visual attention, nonverbal auditory attention, and high level auditory information processing speed. The patient was administered the Wechsler Adult Intelligence Scale - IV. See Appendix I (in media section of this EMR) for full breakdown of IQ scores. There was no clinically significant difference between his average range Working Memory Index score of 100 (50th %ile) and his average range Processing Speed Index score of 94 (34th %ile). His Verbal Comprehension Index score of 125 (95th %ile) was within the superior range. His Perceptual Reasoning Index score of 102 was within the normal range. This pattern of performance is not indicative of a patient who is at increased risk for day-to-day problems with working memory and/or processing speed. Scores are commensurate with what would be expected  based on his performance on a task assessing premorbid functioning levels. IQ scores are within the average to superior range of functioning.        The patient was administered the Buschke Selective Reminding Test and his basic learning and memory (128/144) was normal.  In this regard, his consistency related long term retrieval was impaired (113/144 correct), especially when compared to his normal range Long Term Storage (126/144). His discrepancy score (+ 13 points) is clinically significant and is suggestive of a high level cognitive organization problem and/or high level attention concern. Visual organization and memory abilities were normal on the Boston Complex Figure Test.       Simple timed visual motor sequencing (Trailmaking Test Part A) was within the mildly impaired range with a T score of 36. The patient's performance on a similar, but more complex task of timed visual motor sequencing (Trailmaking Test Part B) was within the below average range with a T score of 42. The patient made zero sequencing errors on this latter test.  Taken together, this pattern of performance is not indicative of a patient who is at increased risk for day-to-day problems with frontal lobe-mediated executive functioning abilities. The patient rated his current level of pain as \"1/5  - Mild\" on the Helen-Melzack Pain Questionnaire. He reported pain in his lower back,. His Dueñas Depression Inventory - II score of 2 was within the minimally depressed range. His Dueñas Anxiety Inventory score of 3 reflected minimal anxiety. The patient was also administered the Personality Assessment Inventory and generated a valid profile for interpretation. Within this context, mild day-to-day stress is reported. The profile is otherwise normal.      Impressions and Recommendations:  From the actual neurocognitive profile, there is support for a \"high functioning\" ADHD- Inattentive type problem. He is also showing problems with high level cognitive organization abilities. These are chronic issues masked previously to some degree by superior range IQ. His performance across all other neurocognitive domains assessed were normal.  Emotionally, there is support for mild adjustment related stress/anxiety. I see the ADHD- Inattentive, issue as chronic, organic, and mild.   The emotional distress is secondary to the impact ADD is having upon him. In addition to continued medical care, my recommendations include   consideration for a 30-day trial of an appropriate attention related medication. During this trial, the patient should keep track of his response to this medication and provide the prescribing physician with feedback at the end of the month regarding its efficacy. He may also benefit from organizational skills related training. Counseling may prove helpful in terms of stress management, but his stress is a function of his ADHD so with treatment for ADHD his stress/anxiety should improve. Baseline now established. Clinical correlation is, of course, indicated. I will discuss these findings with the patient when he follows up with me in the near future. Follow up prn. DIAGNOSES: Attention Deficit  Disorder - Mild     Adjustment Disorder with Anxious/Stressed Features - Mild         The above information is based upon information currently available to me. If there is any additional information of which I am currently unaware, I would be more than happy to review it upon having it made available to me. Thank you for the opportunity to see this interesting individual.     Sincerely,       Michelle Castillo. Bravo Chung, Khalif,LCP    Appendix: IQ Test results (see media section of this EMR)    Cc: Quinn Ennis MD         2 units -57938- 1.5 hours. Record review. Review of history provided by patient. Review of collaborative information. Testing by Clinician. Review of raw data. Scoring. Report writing of individual tests administered by Clinician. Integration of individual tests administered by psychometrist (that were previously reported and billed under psychometry code below) with testing by clinician and review of records/history/collaborative information. Case Conceptualization, Report writing. Coordination Of Care. 4 units  -11883 - 3.5 hours.  Psychometrist test prep, administration, and scoring under clinician's direct supervision. Clinical interpretation of individual tests administered by psychometrist .  Clinician report of individual tests administered by psychometrist.    1 unit - 75282 - 40 minutes. Computer testing and scoring and clinician's interpretation of computer-administered test(s)    \"Unit\" is defined by CPT/National Guidelines (31 - 60 minutes). Integral services including scoring of raw data, data interpretation, case conceptualization, report writing etcetera were initiated after the patient finished testing/raw data collected and was completed on the date the report was signed.

## 2018-09-04 ENCOUNTER — OFFICE VISIT (OUTPATIENT)
Dept: FAMILY MEDICINE CLINIC | Age: 28
End: 2018-09-04

## 2018-09-04 VITALS
DIASTOLIC BLOOD PRESSURE: 84 MMHG | HEIGHT: 70 IN | BODY MASS INDEX: 32.78 KG/M2 | WEIGHT: 229 LBS | TEMPERATURE: 97.9 F | RESPIRATION RATE: 16 BRPM | HEART RATE: 81 BPM | OXYGEN SATURATION: 99 % | SYSTOLIC BLOOD PRESSURE: 126 MMHG

## 2018-09-04 DIAGNOSIS — F98.8 ATTENTION DEFICIT DISORDER (ADD) WITHOUT HYPERACTIVITY: Primary | ICD-10-CM

## 2018-09-04 RX ORDER — METHYLPHENIDATE HYDROCHLORIDE 36 MG/1
72 TABLET ORAL DAILY
Qty: 60 TAB | Refills: 0 | Status: SHIPPED | OUTPATIENT
Start: 2018-09-04 | End: 2019-01-03 | Stop reason: SDUPTHER

## 2018-09-04 RX ORDER — METHYLPHENIDATE HYDROCHLORIDE 36 MG/1
72 TABLET ORAL DAILY
Qty: 60 TAB | Refills: 0 | Status: SHIPPED | OUTPATIENT
Start: 2018-10-30 | End: 2019-01-03 | Stop reason: SDUPTHER

## 2018-09-04 RX ORDER — METHYLPHENIDATE HYDROCHLORIDE 36 MG/1
72 TABLET ORAL DAILY
Qty: 60 TAB | Refills: 0 | Status: SHIPPED | OUTPATIENT
Start: 2018-10-02 | End: 2019-01-03 | Stop reason: SDUPTHER

## 2018-09-04 NOTE — PATIENT INSTRUCTIONS
Methylphenidate (By mouth)   Methylphenidate (meth-il-FEN-i-date)  Treats ADHD. Also treats narcolepsy. Brand Name(s): Aptensio XR, Concerta, Cotempla XR-ODT, Metadate CD, Metadate ER, Methylin, QuilliChew ER, Quillivant XR, Ritalin, Ritalin LA   There may be other brand names for this medicine. When This Medicine Should Not Be Used: This medicine is not right for everyone. Do not use it if you had an allergic reaction to methylphenidate, or if you have glaucoma, an overactive thyroid, muscle tics, or a history of Tourette syndrome. How to Use This Medicine:   Long Acting Capsule, Liquid, Tablet, Chewable Tablet, Long Acting Tablet, Long Acting Chewable Tablet, Long Acting Dissolving Tablet  · Take your medicine as directed. Your dose may need to be changed several times to find what works best for you. · This medicine should come with a Medication Guide. Ask your pharmacist for a copy if you do not have one. · Chewable tablet: Drink at least 8 ounces of water or other liquid when you take the tablet. · Chewable tablet, immediate-release tablet, or oral liquid: Take the medicine 30 to 45 minutes before meals. Take the last dose of the day before 6 PM if you have problems falling asleep. · Extended-release capsule: Take your medicine in the morning before breakfast. Swallow it whole with water or other liquid. If you cannot swallow the capsule whole, you may open it and mix the medicine with a tablespoon of applesauce. Swallow this mixture right away, and then drink some water. · Extended-release tablet: Take the medicine in the morning. Swallow it whole with water or other liquid. Do not crush, break, or chew it. · Extended-release chewable tablet: Take this medicine in the morning. If the tablet is scored, you may cut it in half if you need to. Do not break a tablet that is not scored. · Extended-release disintegrating tablet: Make sure your hands are dry before you handle the disintegrating tablet. Peel back the foil from the blister pack, then remove the tablet. Do not push the tablet through the foil. Place the tablet in your mouth. After it has melted, swallow or take a drink of water. Take the medicine in the morning. Do not crush or chew it. · Extended-release suspension: Take the medicine in the morning. Shake the bottle well for at least 10 seconds before you measure each dose. Measure the dose with the dispenser that comes with the medicine. · Oral liquid: Measure the oral liquid medicine with a marked measuring spoon, oral syringe, or medicine cup. · If you take the extended-release tablet, part of the tablet may pass into your stools. This is normal and is nothing to worry about. · Missed dose: Take a dose as soon as you remember. If it is almost time for your next dose, wait until then and take a regular dose. Do not take extra medicine to make up for a missed dose. · Store the medicine in a closed container at room temperature, away from heat, moisture, and direct light. Throw away any unused extended-release suspension after 4 months. Store the extended-release disintegrating tablets in the reusable travel case after removing them from the carton. Drugs and Foods to Avoid:   Ask your doctor or pharmacist before using any other medicine, including over-the-counter medicines, vitamins, and herbal products. · Do not use this medicine if you have used an MAO inhibitor (MAOI) within the past 14 days. · Some medicines can affect how methylphenidate works. The specific medicines and foods of concern are different for different brands of methylphenidate.  Tell your doctor if you are using any of the following:   ¨ Guanethidine, phenylbutazone  ¨ Antacid or other stomach medicine (including famotidine, omeprazole)  ¨ Blood pressure medicine  ¨ Blood thinner (including warfarin)  ¨ Medicine to treat depression (including clomipramine, desipramine, imipramine)  ¨ Medicine to treat seizures (including phenobarbital, phenytoin, primidone)  · Do not drink alcohol while you are using this medicine. Warnings While Using This Medicine:   · Tell your doctor if you are pregnant or breastfeeding, or if you have heart or blood vessel disease, heart rhythm problems, high blood pressure, phenylketonuria, thyroid problems, or a history of seizures, heart attack, or stroke. Tell your doctor if you or anyone in your family has a history of depression, mental health problems, or drug or alcohol abuse. · This medicine may cause the following problems:  ¨ Serious heart or blood vessel problems, including heart attack and stroke (especially in people who already have heart problems)  ¨ Peripheral vasculopathy (a blood circulation problem)  ¨ Slow growth in children  · This medicine can be habit-forming. Do not use more than your prescribed dose. Call your doctor if you think your medicine is not working. · This medicine may make you dizzy or cause blurred vision. Do not drive or do anything else that could be dangerous until you know how this medicine affects you. · If you need surgery, tell the doctor who treats you that you are using this medicine. Medicines used during surgery can increase your blood pressure when used with this medicine. · Your doctor will check your progress and the effects of this medicine at regular visits. Keep all appointments. · Keep all medicine out of the reach of children. Never share your medicine with anyone.   Possible Side Effects While Using This Medicine:   Call your doctor right away if you notice any of these side effects:  · Allergic reaction: Itching or hives, swelling in your face or hands, swelling or tingling in your mouth or throat, chest tightness, trouble breathing  · Blurred vision or vision changes  · Chest pain that may spread, trouble breathing, nausea, unusual sweating  · Extreme energy or restlessness, confusion, agitation, unusual moods or behaviors  · Fast, slow, pounding, or uneven heartbeat  · Lightheadedness, dizziness, fainting  · Numb, cold, pale, or painful fingers or toes  · Painful erection or an erection that lasts longer than 4 hours  · Seeing, hearing, or feeling things that are not there  · Seizures  If you notice these less serious side effects, talk with your doctor:   · Dry mouth, nausea, stomach pain  · Loss of appetite, weight loss  · Trouble sleeping  If you notice other side effects that you think are caused by this medicine, tell your doctor. Call your doctor for medical advice about side effects. You may report side effects to FDA at 2-564-FDA-8421  © 2017 [address] Information is for End User's use only and may not be sold, redistributed or otherwise used for commercial purposes. The above information is an  only. It is not intended as medical advice for individual conditions or treatments. Talk to your doctor, nurse or pharmacist before following any medical regimen to see if it is safe and effective for you.

## 2018-09-04 NOTE — PROGRESS NOTES
Chief Complaint   Patient presents with    Medication Evaluation     1. Have you been to the ER, urgent care clinic since your last visit? Hospitalized since your last visit? No    2. Have you seen or consulted any other health care providers outside of the [address] [place] [address] since your last visit? Include any pap smears or colon screening.  No         Stopped taking end of last year---

## 2018-09-04 NOTE — MR AVS SNAPSHOT
[address] 
[phone] Patient: Gage Rosenberg MRN: [id number] [id number] Visit Information Date & Time Provider Department Dept. Phone Encounter #  
 9/4/2018  8:20 AM Andrew Ohara [phone] [id number] Follow-up Instructions Return in about 3 months (around 12/4/2018) for ADHD follow-up. Upcoming Health Maintenance Date Due DTaP/Tdap/Td series (1 - Tdap) 6/17/2009 Influenza Age 5 to Adult 8/1/2018 Allergies as of 9/4/2018  Review Complete On: 9/4/2018 By: Ciara Olmos LPN No Known Allergies Current Immunizations  Reviewed on 1/30/2017 No immunizations on file. Not reviewed this visit You Were Diagnosed With   
  
 Codes Comments Attention deficit disorder (ADD) without hyperactivity    -  Primary ICD-10-CM: F98.8 ICD-9-CM: 314.00 Vitals BP Pulse Temp Resp Height(growth percentile) Weight(growth percentile) 126/84 81 97.9 °F (36.6 °C) (Oral) 16 5' 10\" (1.778 m) 229 lb (103.9 kg) SpO2 BMI Smoking Status 99% 32.86 kg/m2 Never Smoker Vitals History BMI and BSA Data Body Mass Index Body Surface Area  
 32.86 kg/m 2 2.27 m 2 Preferred Pharmacy Pharmacy Name Phone [id number] [place], [place] [address] AT [facility] [phone] Your Updated Medication List  
  
   
This list is accurate as of 9/4/18  9:01 AM.  Always use your most recent med list.  
  
  
  
  
 ibuprofen 200 mg tablet Commonly known as:  MOTRIN Take 3 Tabs by mouth every six (6) hours as needed for Pain. * methylphenidate HCl 36 mg CR tablet Commonly known as:  CONCERTA Take 2 Tabs (72 mg total) by mouth daily. Max Daily Amount: 72 mg  
  
 * methylphenidate HCl 36 mg CR tablet Commonly known as:  CONCERTA Take 2 Tabs (72 mg total) by mouth dailyEarliest Fill Date: 10/2/18. Max Daily Amount: 72 mg Start taking on:  10/2/2018 * methylphenidate HCl 36 mg CR tablet Commonly known as:  CONCERTA Take 2 Tabs (72 mg total) by mouth dailyEarliest Fill Date: 10/30/18. Max Daily Amount: 72 mg Start taking on:  10/30/2018 TUMS EXTRA STRENGTH SMOOTHIES 300 mg (750 mg) chewable tablet Generic drug:  calcium carbonate Take 1 Tab by mouth daily. * Notice: This list has 3 medication(s) that are the same as other medications prescribed for you. Read the directions carefully, and ask your doctor or other care provider to review them with you. Prescriptions Printed Refills  
 methylphenidate ER 36 mg 24 hr tab 0 Sig: Take 2 Tabs (72 mg total) by mouth daily. Max Daily Amount: 72 mg Class: Print Route: Oral  
 methylphenidate ER 36 mg 24 hr tab 0 Starting on: 10/2/2018 Sig: Take 2 Tabs (72 mg total) by mouth dailyEarliest Fill Date: 10/2/18. Max Daily Amount: 72 mg Class: Print Route: Oral  
 methylphenidate ER 36 mg 24 hr tab 0 Starting on: 10/30/2018 Sig: Take 2 Tabs (72 mg total) by mouth dailyEarliest Fill Date: 10/30/18. Max Daily Amount: 72 mg Class: Print Route: Oral  
  
Follow-up Instructions Return in about 3 months (around 12/4/2018) for ADHD follow-up. Patient Instructions Methylphenidate (By mouth) Methylphenidate (meth-il-FEN-i-date) Treats ADHD. Also treats narcolepsy. Brand Name(s): Aptensio XR, Concerta, Cotempla XR-ODT, Metadate CD, Metadate ER, Methylin, QuilliChew ER, Quillivant XR, Ritalin, Ritalin LA There may be other brand names for this medicine. When This Medicine Should Not Be Used: This medicine is not right for everyone. Do not use it if you had an allergic reaction to methylphenidate, or if you have glaucoma, an overactive thyroid, muscle tics, or a history of Tourette syndrome. How to Use This Medicine:  
Long Acting Capsule, Liquid, Tablet, Chewable Tablet, Long Acting Tablet, Long Acting Chewable Tablet, Long Acting Dissolving Tablet · Take your medicine as directed. Your dose may need to be changed several times to find what works best for you. · This medicine should come with a Medication Guide. Ask your pharmacist for a copy if you do not have one. · Chewable tablet: Drink at least 8 ounces of water or other liquid when you take the tablet. · Chewable tablet, immediate-release tablet, or oral liquid: Take the medicine 30 to 45 minutes before meals. Take the last dose of the day before 6 PM if you have problems falling asleep. · Extended-release capsule: Take your medicine in the morning before breakfast. Swallow it whole with water or other liquid. If you cannot swallow the capsule whole, you may open it and mix the medicine with a tablespoon of applesauce. Swallow this mixture right away, and then drink some water. · Extended-release tablet: Take the medicine in the morning. Swallow it whole with water or other liquid. Do not crush, break, or chew it. · Extended-release chewable tablet: Take this medicine in the morning. If the tablet is scored, you may cut it in half if you need to. Do not break a tablet that is not scored. · Extended-release disintegrating tablet: Make sure your hands are dry before you handle the disintegrating tablet. Peel back the foil from the blister pack, then remove the tablet. Do not push the tablet through the foil. Place the tablet in your mouth. After it has melted, swallow or take a drink of water. Take the medicine in the morning. Do not crush or chew it. · Extended-release suspension: Take the medicine in the morning. Shake the bottle well for at least 10 seconds before you measure each dose. Measure the dose with the dispenser that comes with the medicine.  
· Oral liquid: Measure the oral liquid medicine with a marked measuring spoon, oral syringe, or medicine cup. · If you take the extended-release tablet, part of the tablet may pass into your stools. This is normal and is nothing to worry about. · Missed dose: Take a dose as soon as you remember. If it is almost time for your next dose, wait until then and take a regular dose. Do not take extra medicine to make up for a missed dose. · Store the medicine in a closed container at room temperature, away from heat, moisture, and direct light. Throw away any unused extended-release suspension after 4 months. Store the extended-release disintegrating tablets in the reusable travel case after removing them from the carton. Drugs and Foods to Avoid: Ask your doctor or pharmacist before using any other medicine, including over-the-counter medicines, vitamins, and herbal products. · Do not use this medicine if you have used an MAO inhibitor (MAOI) within the past 14 days. · Some medicines can affect how methylphenidate works. The specific medicines and foods of concern are different for different brands of methylphenidate. Tell your doctor if you are using any of the following:  
¨ Guanethidine, phenylbutazone ¨ Antacid or other stomach medicine (including famotidine, omeprazole) ¨ Blood pressure medicine ¨ Blood thinner (including warfarin) ¨ Medicine to treat depression (including clomipramine, desipramine, imipramine) ¨ Medicine to treat seizures (including phenobarbital, phenytoin, primidone) · Do not drink alcohol while you are using this medicine. Warnings While Using This Medicine: · Tell your doctor if you are pregnant or breastfeeding, or if you have heart or blood vessel disease, heart rhythm problems, high blood pressure, phenylketonuria, thyroid problems, or a history of seizures, heart attack, or stroke. Tell your doctor if you or anyone in your family has a history of depression, mental health problems, or drug or alcohol abuse. · This medicine may cause the following problems: 
¨ Serious heart or blood vessel problems, including heart attack and stroke (especially in people who already have heart problems) ¨ Peripheral vasculopathy (a blood circulation problem) ¨ Slow growth in children · This medicine can be habit-forming. Do not use more than your prescribed dose. Call your doctor if you think your medicine is not working. · This medicine may make you dizzy or cause blurred vision. Do not drive or do anything else that could be dangerous until you know how this medicine affects you. · If you need surgery, tell the doctor who treats you that you are using this medicine. Medicines used during surgery can increase your blood pressure when used with this medicine. · Your doctor will check your progress and the effects of this medicine at regular visits. Keep all appointments. · Keep all medicine out of the reach of children. Never share your medicine with anyone. Possible Side Effects While Using This Medicine:  
Call your doctor right away if you notice any of these side effects: · Allergic reaction: Itching or hives, swelling in your face or hands, swelling or tingling in your mouth or throat, chest tightness, trouble breathing · Blurred vision or vision changes · Chest pain that may spread, trouble breathing, nausea, unusual sweating · Extreme energy or restlessness, confusion, agitation, unusual moods or behaviors · Fast, slow, pounding, or uneven heartbeat · Lightheadedness, dizziness, fainting · Numb, cold, pale, or painful fingers or toes · Painful erection or an erection that lasts longer than 4 hours · Seeing, hearing, or feeling things that are not there · Seizures If you notice these less serious side effects, talk with your doctor: · Dry mouth, nausea, stomach pain · Loss of appetite, weight loss · Trouble sleeping If you notice other side effects that you think are caused by this medicine, tell your doctor. Call your doctor for medical advice about side effects. You may report side effects to FDA at 5-845-FVJ-1413 © 2017 [facility] Information is for End User's use only and may not be sold, redistributed or otherwise used for commercial purposes. The above information is an  only. It is not intended as medical advice for individual conditions or treatments. Talk to your doctor, nurse or pharmacist before following any medical regimen to see if it is safe and effective for you. Introducing [facility] SERVICES! Dear Radha Sharpe: Thank you for requesting a G[employer] Touch account. Our records indicate that you already have an active Gema Touch account. You can access your account anytime at [email or web address] Did you know that you can access your hospital and ER discharge instructions at any time in Gema Touch? You can also review all of your test results from your hospital stay or ER visit. Additional Information If you have questions, please visit the Frequently Asked Questions section of the Gema Touch website at [email or web address] Remember, Gema Touch is NOT to be used for urgent needs. For medical emergencies, dial 911. Now available from your iPhone and Android! Please provide this summary of care documentation to your next provider. Your primary care clinician is listed as Dylan Ramirez. If you have any questions after today's visit, please call [phone].

## 2018-09-30 NOTE — PROGRESS NOTES
[address]: Progress Note  Randi Hodgkins, MD  9/30/2018   Chief Complaint   Patient presents with    Medication Evaluation     REFILL--methylphenidate--        Subjective   Simeon Sorensen is a 25 y.o. male who presents to clinic today for ADHD Follow-up. ADHD Medication compliance: summertime off    · New Stressors affecting patient? 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 · If there is counseling or mental health involvement, is pt attending sessions? Effective? n/a      Growth  Wt Readings from Last 3 Encounters:   09/04/18 229 lb (103.9 kg)   02/05/18 222 lb 12.8 oz (101.1 kg)   10/13/17 214 lb (97.1 kg)     Ht Readings from Last 3 Encounters:   09/04/18 5' 10\" (1.778 m)   02/05/18 5' 10\" (1.778 m)   10/13/17 5' 10\" (1.778 m)     Body mass index is 32.86 kg/(m^2). Facility age limit for growth percentiles is 20 years. Facility age limit for growth percentiles is 20 years. Facility age limit for growth percentiles is 20 years. ROS:  · Appetite: Normal  · Sleep: Normal  · Headache: None  · Stomachache: None  · Tics: None  · Picking: None  · More Emotional: No  · Dull/listless: No  · Irritable: No   · Socially withdrawn: No  · Other: None     Patients past medical history and social history reviewed:  Past Medical History:   Diagnosis Date    ADHD (attention deficit hyperactivity disorder) 1997    neuro-evaluation done. on concerta.  ADHD (attention deficit hyperactivity disorder)      Social History   Substance Use Topics    Smoking status: Never Smoker    Smokeless tobacco: Never Used    Alcohol use 0.6 - 1.2 oz/week     1 - 2 Cans of beer per week    Allergies and Medications reviewed and updated. Current Outpatient Prescriptions   Medication Sig Dispense Refill    methylphenidate ER 36 mg 24 hr tab Take 2 Tabs (72 mg total) by mouth daily.   Max Daily Amount: 72 mg 60 Tab 0    [START ON 10/2/2018] methylphenidate ER 36 mg 24 hr tab Take 2 Tabs (72 mg total) by mouth dailyEarliest Fill Date: 10/2/18. Max Daily Amount: 72 mg 60 Tab 0    [START ON 10/30/2018] methylphenidate ER 36 mg 24 hr tab Take 2 Tabs (72 mg total) by mouth dailyEarliest Fill Date: 10/30/18. Max Daily Amount: 72 mg 60 Tab 0    ibuprofen (MOTRIN) 200 mg tablet Take 3 Tabs by mouth every six (6) hours as needed for Pain. 90 Tab 1    calcium carbonate (TUMS EXTRA STRENGTH SMOOTHIES) 300 mg (750 mg) chewable tablet Take 1 Tab by mouth daily. Objective     Visit Vitals    /84    Pulse 81    Temp 97.9 °F (36.6 °C) (Oral)    Resp 16    Ht 5' 10\" (1.778 m)    Wt 229 lb (103.9 kg)    SpO2 99%    BMI 32.86 kg/m2     Gen: Seated, NAD. Observation of Chandra's behaviors in the exam room included no unusual activities  HEENT: NC/AT, PERRLA, TM Normal Bilaterally, No thyromegally  Heart: RRR, no m/r/g  Lungs: CTA bilaterally  Abd: +BS, soft, NT, ND  Neuro: 2+ DTR, no tics noted         Assessment/Plan:   1. Attention deficit disorder (ADD) without hyperactivity  Reviewed medication use, risk and possible adverse effects. Patient to notify us if he notes any of these symptoms. - methylphenidate ER 36 mg 24 hr tab; Take 2 Tabs (72 mg total) by mouth daily. Max Daily Amount: 72 mg  Dispense: 60 Tab; Refill: 0  - methylphenidate ER 36 mg 24 hr tab; Take 2 Tabs (72 mg total) by mouth dailyEarliest Fill Date: 10/2/18. Max Daily Amount: 72 mg  Dispense: 60 Tab; Refill: 0  - methylphenidate ER 36 mg 24 hr tab; Take 2 Tabs (72 mg total) by mouth dailyEarliest Fill Date: 10/30/18. Max Daily Amount: 72 mg  Dispense: 60 Tab;  Refill: 0      [  ] Follow-up Teacher Ga given to parent to bring to next appointment  [  ] Coordinator will need to fax follow-up Teacher Ga to school  [ X  ] Follow-up Teacher Ga not needed for next apointment  [  ] There is a Consent to Exchange Information signed for school where child is currently enrolled    Phone number where parent can be reached: [phone]    I have discussed the diagnosis with the patient and parent(s) and the intended plan as seen in the above orders. he has expressed understanding. The patient has received an after-visit summary and questions were answered concerning future plans. I have discussed medication side effects and warnings with the patient as well.     Patient to return for follow-up appointment in 3 months    Electronically Signed: Emily Delatorre MD      '

## 2018-12-04 ENCOUNTER — TELEPHONE (OUTPATIENT)
Dept: FAMILY MEDICINE CLINIC | Age: 28
End: 2018-12-04

## 2018-12-04 NOTE — TELEPHONE ENCOUNTER
Patient calling to schedule his 3 month follow up for concerta. Patient is a  and only had certain days he could come and which you were not on schedule. He then asked for 1/3/19 and which you are working night clinic. Is it okay to schedule this day?     Please let me know and I can contact patient    Pt ph [phone]

## 2018-12-04 NOTE — TELEPHONE ENCOUNTER
Dr. Don Alexandra / Telephone   Received: Today   Message Contents   Varinder Anthony [facility] Office   Phone Number: [phone]             Pt returning call and requested a call back regarding scheduling an appt on 01/03. Please a voicemail with the appt time.

## 2019-01-03 ENCOUNTER — OFFICE VISIT (OUTPATIENT)
Dept: FAMILY MEDICINE CLINIC | Age: 29
End: 2019-01-03

## 2019-01-03 VITALS
WEIGHT: 230 LBS | TEMPERATURE: 98.3 F | HEART RATE: 83 BPM | SYSTOLIC BLOOD PRESSURE: 110 MMHG | RESPIRATION RATE: 17 BRPM | DIASTOLIC BLOOD PRESSURE: 71 MMHG | HEIGHT: 70 IN | OXYGEN SATURATION: 98 % | BODY MASS INDEX: 32.93 KG/M2

## 2019-01-03 DIAGNOSIS — F98.8 ATTENTION DEFICIT DISORDER (ADD) WITHOUT HYPERACTIVITY: Primary | ICD-10-CM

## 2019-01-03 DIAGNOSIS — L91.8 INFLAMED SKIN TAG: ICD-10-CM

## 2019-01-03 RX ORDER — METHYLPHENIDATE HYDROCHLORIDE 36 MG/1
72 TABLET ORAL DAILY
Qty: 60 TAB | Refills: 0 | Status: SHIPPED | OUTPATIENT
Start: 2019-01-03 | End: 2019-04-25 | Stop reason: SDUPTHER

## 2019-01-03 NOTE — PROGRESS NOTES
[address]: Progress Note Narcisa Oquendo MD 
1/3/2019 Chief Complaint Patient presents with  Medication Refill  
  referral to dermatologist---skin tag--lower left side of back Subjective Jaquan Patel is a 25 y.o. male who presents to clinic today for ADHD Follow-up. ADHD Medication compliance: all of the time · Patient's/Parent's perception of whether/to what extent meds are effective (Ga, comments): Patient notes that medication is working and his attention is stable. · New Stressors affecting patient? None · If there is counseling or mental health involvement, is pt attending sessions? Effective? N/A Growth Wt Readings from Last 3 Encounters:  
01/03/19 230 lb (104.3 kg) 09/04/18 229 lb (103.9 kg) 02/05/18 222 lb 12.8 oz (101.1 kg) Ht Readings from Last 3 Encounters:  
01/03/19 5' 10\" (1.778 m) 09/04/18 5' 10\" (1.778 m) 02/05/18 5' 10\" (1.778 m) Body mass index is 33 kg/m². Facility age limit for growth percentiles is 20 years. Facility age limit for growth percentiles is 20 years. Facility age limit for growth percentiles is 20 years. ROS: 
· Appetite: Normal 
· Sleep: Normal 
· Headache: None · Stomachache: None · Tics: None · Picking: None · More Emotional: No 
· Dull/listless: No 
· Irritable: No  
· Socially withdrawn: No 
· Other: None Patients past medical history and social history reviewed: 
Past Medical History:  
Diagnosis Date  ADHD (attention deficit hyperactivity disorder) 1997  
 neuro-evaluation done. on concerta.  ADHD (attention deficit hyperactivity disorder) Social History Tobacco Use  Smoking status: Never Smoker  Smokeless tobacco: Never Used Substance Use Topics  Alcohol use: Yes Alcohol/week: 0.6 - 1.2 oz Types: 1 - 2 Cans of beer per week  Drug use: No  
 Allergies and Medications reviewed and updated. Current Outpatient Medications Medication Sig Dispense Refill  methylphenidate ER 36 mg 24 hr tab Take 2 Tabs (72 mg total) by mouth daily. Max Daily Amount: 72 mg 60 Tab 0  
 methylphenidate ER 36 mg 24 hr tab Take 2 Tabs (72 mg total) by mouth daily. Max Daily Amount: 72 mg 60 Tab 0  
 methylphenidate ER 36 mg 24 hr tab Take 2 Tabs (72 mg total) by mouth daily. Max Daily Amount: 72 mg 60 Tab 0  ibuprofen (MOTRIN) 200 mg tablet Take 3 Tabs by mouth every six (6) hours as needed for Pain. 90 Tab 1  calcium carbonate (TUMS EXTRA STRENGTH SMOOTHIES) 300 mg (750 mg) chewable tablet Take 1 Tab by mouth daily. Objective Visit Vitals /71 Pulse 83 Temp 98.3 °F (36.8 °C) (Oral) Resp 17 Ht 5' 10\" (1.778 m) Wt 230 lb (104.3 kg) SpO2 98% BMI 33.00 kg/m² Gen: Seated, NAD. Observation of Chandra's behaviors in the exam room included no unusual activities HEENT: NC/AT, PERRLA, TM Normal Bilaterally, No thyromegally Heart: RRR, no m/r/g Lungs: CTA bilaterally Abd: +BS, soft, NT, ND Neuro: 2+ DTR, no tics noted Skin: Approximately 1.5 cm x 1.5 cm x 1.5 cm skin tag. Base approximately 1 cm across where area with erythematous ring. Some scabbing on the distal portion of the skin tag. Assessment/Plan: 1. Attention deficit disorder (ADD) without hyperactivity  reviewed and consistent with patient fill history. Medication use agreement on file. Will get a UDS his next appointment. - methylphenidate ER 36 mg 24 hr tab; Take 2 Tabs (72 mg total) by mouth daily. Max Daily Amount: 72 mg  Dispense: 60 Tab; Refill: 0 
- methylphenidate ER 36 mg 24 hr tab; Take 2 Tabs (72 mg total) by mouth daily. Max Daily Amount: 72 mg  Dispense: 60 Tab; Refill: 0 
- methylphenidate ER 36 mg 24 hr tab; Take 2 Tabs (72 mg total) by mouth daily. Max Daily Amount: 72 mg  Dispense: 60 Tab; Refill: 0 
 
2. Inflamed skin tag Recommend surgical resection as this is beltline and continues to be inflamed I have discussed the diagnosis with the patient and parent(s) and the intended plan as seen in the above orders. he has expressed understanding. The patient has received an after-visit summary and questions were answered concerning future plans. I have discussed medication side effects and warnings with the patient as well. Patient to return for follow-up appointment in 3 months Electronically Signed: Beti Nieto MD

## 2019-01-03 NOTE — PATIENT INSTRUCTIONS
Attention Deficit Hyperactivity Disorder (ADHD) in Adults: Care Instructions Your Care Instructions Attention deficit hyperactivity disorder, or ADHD, is a condition that makes it hard to pay attention. So you may have problems when you try to focus, get organized, and finish tasks. It might make you more active than other people. Or you might do things without thinking first. 
ADHD is very common. It usually starts in early childhood. Many adults don't realize they have it until their children are diagnosed. Then they become aware of their own symptoms. Doctors don't know what causes ADHD. But it often runs in families. ADHD can be treated with medicines, behavior training, and counseling. Treatment can improve your life. Follow-up care is a key part of your treatment and safety. Be sure to make and go to all appointments, and call your doctor if you are having problems. It's also a good idea to know your test results and keep a list of the medicines you take. How can you care for yourself at home? · Learn all you can about ADHD. This will help you and your family understand it better. · Take your medicines exactly as prescribed. Call your doctor if you think you are having a problem with your medicine. You will get more details on the specific medicines your doctor prescribes. · If you miss a dose of your medicine, do not take an extra dose. · If your doctor suggests counseling, find a counselor you like and trust. Talk openly and honestly. Be willing to make some changes. · Find a support group for adults with ADHD. Talking to others with the same problems can help you feel better. It can also give you ideas about how to best cope with the condition. · Get rid of distractions at your work space. Keep your desk clean. Try not to face a window or busy hallway. · Use files, planners, and other tools to keep you organized. · Limit use of alcohol, and do not use illegal drugs.  People with ADHD tend to become addicted more easily than others. Tell your doctor if you need help to quit. Counseling, support groups, and sometimes medicines can help you stay free of alcohol or drugs. · Get at least 30 minutes of physical activity on most days of the week. Exercise has been shown to help people cope with ADHD. Walking is a good choice. You also may want to do other activities, such as running, swimming, cycling, or playing tennis or team sports. When should you call for help? Watch closely for changes in your health, and be sure to contact your doctor if: 
  · You feel sad a lot or cry all the time.  
  · You have trouble sleeping, or you sleep too much.  
  · You find it hard to concentrate, make decisions, or remember things.  
  · You change how you normally eat.  
  · You feel guilty for no reason. Where can you learn more? Go to http://nikhil-tika.info/. Enter B196 in the search box to learn more about \"Attention Deficit Hyperactivity Disorder (ADHD) in Adults: Care Instructions. \" Current as of: December 7, 2017 Content Version: 11.8 © 2396-6144 [employer]. Care instructions adapted under license by [employer] (which disclaims liability or warranty for this information). If you have questions about a medical condition or this instruction, always ask your healthcare professional. [place] [address] any warranty or liability for your use of this information.  reviewed. Medications agreement

## 2019-01-03 NOTE — PROGRESS NOTES
Chief Complaint Patient presents with  Medication Refill  
  referral to dermatologist---skin tag--lower left side of back 1. Have you been to the ER, urgent care clinic since your last visit? Hospitalized since your last visit? No 
 
2. Have you seen or consulted any other health care providers outside of the [address] [place] [address] since your last visit? Include any pap smears or colon screening.  No

## 2019-01-21 PROBLEM — L91.8 INFLAMED SKIN TAG: Status: ACTIVE | Noted: 2019-01-21

## 2019-03-15 ENCOUNTER — TELEPHONE (OUTPATIENT)
Dept: FAMILY MEDICINE CLINIC | Age: 29
End: 2019-03-15

## 2019-03-15 NOTE — TELEPHONE ENCOUNTER
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

## 2019-04-25 ENCOUNTER — OFFICE VISIT (OUTPATIENT)
Dept: FAMILY MEDICINE CLINIC | Age: 29
End: 2019-04-25

## 2019-04-25 VITALS
TEMPERATURE: 98.7 F | HEART RATE: 95 BPM | SYSTOLIC BLOOD PRESSURE: 122 MMHG | DIASTOLIC BLOOD PRESSURE: 86 MMHG | WEIGHT: 223.4 LBS | OXYGEN SATURATION: 97 % | HEIGHT: 70 IN | RESPIRATION RATE: 20 BRPM | BODY MASS INDEX: 31.98 KG/M2

## 2019-04-25 DIAGNOSIS — F98.8 ATTENTION DEFICIT DISORDER (ADD) WITHOUT HYPERACTIVITY: ICD-10-CM

## 2019-04-25 RX ORDER — METHYLPHENIDATE HYDROCHLORIDE 36 MG/1
72 TABLET ORAL DAILY
Qty: 60 TAB | Refills: 0 | Status: SHIPPED | OUTPATIENT
Start: 2019-06-20 | End: 2019-09-03 | Stop reason: SDUPTHER

## 2019-04-25 RX ORDER — METHYLPHENIDATE HYDROCHLORIDE 36 MG/1
72 TABLET ORAL DAILY
Qty: 60 TAB | Refills: 0 | Status: SHIPPED | OUTPATIENT
Start: 2019-04-25 | End: 2019-09-03 | Stop reason: SDUPTHER

## 2019-04-25 RX ORDER — METHYLPHENIDATE HYDROCHLORIDE 36 MG/1
72 TABLET ORAL DAILY
Qty: 60 TAB | Refills: 0 | Status: SHIPPED | OUTPATIENT
Start: 2019-05-23 | End: 2019-09-03 | Stop reason: SDUPTHER

## 2019-04-25 NOTE — PROGRESS NOTES
[address]: Progress Note  Phillip Khan MD  4/25/2019   Chief Complaint   Patient presents with    Medication Refill     Methylphenidate        Subjective   Mery Redd is a 25 y.o. male who presents to clinic today for ADHD Follow-up. .    ADHD Medication compliance: weekends and school holidays off    · Patient's perception of whether/to what extent meds are effective:  Currently stable. 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 · New Stressors affecting patient:?  None    · If there is counseling or mental health involvement, is pt attending sessions? Effective? None at this time. Growth  Wt Readings from Last 3 Encounters:   04/25/19 223 lb 6.4 oz (101.3 kg)   01/03/19 230 lb (104.3 kg)   09/04/18 229 lb (103.9 kg)     Ht Readings from Last 3 Encounters:   04/25/19 5' 10\" (1.778 m)   01/03/19 5' 10\" (1.778 m)   09/04/18 5' 10\" (1.778 m)     Body mass index is 32.05 kg/m². Facility age limit for growth percentiles is 20 years. Facility age limit for growth percentiles is 20 years. Facility age limit for growth percentiles is 20 years. ROS:  · Appetite: Normal  · Sleep: Normal  · Headache: None  · Stomachache: None  · Tics: None  · Picking: None  · More Emotional: No  · Dull/listless: No  · Irritable: No   · Socially withdrawn: No  · Other: None     Patients past medical history and social history reviewed:  Past Medical History:   Diagnosis Date    ADHD (attention deficit hyperactivity disorder) 1997    neuro-evaluation done. on concerta.  ADHD (attention deficit hyperactivity disorder)      Social History     Tobacco Use    Smoking status: Never Smoker    Smokeless tobacco: Never Used   Substance Use Topics    Alcohol use: Yes     Alcohol/week: 0.6 - 1.2 oz     Types: 1 - 2 Cans of beer per week    Drug use: No    Allergies and Medications reviewed and updated.   Current Outpatient Medications Medication Sig Dispense Refill    methylphenidate ER 36 mg 24 hr tab Take 2 Tabs (72 mg total) by mouth daily. Max Daily Amount: 72 mg 60 Tab 0    methylphenidate ER 36 mg 24 hr tab Take 2 Tabs (72 mg total) by mouth daily. Max Daily Amount: 72 mg 60 Tab 0    methylphenidate ER 36 mg 24 hr tab Take 2 Tabs (72 mg total) by mouth daily. Max Daily Amount: 72 mg 60 Tab 0    calcium carbonate (TUMS EXTRA STRENGTH SMOOTHIES) 300 mg (750 mg) chewable tablet Take 1 Tab by mouth daily.  ibuprofen (MOTRIN) 200 mg tablet Take 3 Tabs by mouth every six (6) hours as needed for Pain. 90 Tab 1        Objective     Visit Vitals  /86 (BP 1 Location: Left arm, BP Patient Position: Sitting)   Pulse 95   Temp 98.7 °F (37.1 °C) (Oral)   Resp 20   Ht 5' 10\" (1.778 m)   Wt 223 lb 6.4 oz (101.3 kg)   SpO2 97%   BMI 32.05 kg/m²     Gen: Seated, NAD. Observation of Chandra's behaviors in the exam room included no unusual activities  HEENT: NC/AT, PERRLA, TM Normal Bilaterally, No thyromegally  Heart: RRR, no m/r/g  Lungs: CTA bilaterally  Abd: +BS, soft, NT, ND  Neuro: 2+ DTR, no tics noted         Assessment/Plan:   Problem List Items Addressed This Visit        Other    ADD (attention deficit disorder)     Stable on current dose. No side-effects noted at this time.  reviewed and consistent with fill history. Will refill medications for 3 months. Relevant Medications    methylphenidate ER 36 mg 24 hr tab (Start on 6/20/2019)    methylphenidate ER 36 mg 24 hr tab (Start on 5/23/2019)    methylphenidate ER 36 mg 24 hr tab          I have discussed the diagnosis with the patient and parent(s) and the intended plan as seen in the above orders. he has expressed understanding. The patient has received an after-visit summary and questions were answered concerning future plans. I have discussed medication side effects and warnings with the patient as well.     Patient to return for follow-up appointment in 3 months    Electronically Signed: Darlene Chavez MD

## 2019-04-25 NOTE — PATIENT INSTRUCTIONS
Attention Deficit Hyperactivity Disorder (ADHD) in Adults: Care Instructions  Your Care Instructions    Attention deficit hyperactivity disorder, or ADHD, is a condition that makes it hard to pay attention. So you may have problems when you try to focus, get organized, and finish tasks. It might make you more active than other people. Or you might do things without thinking first.  ADHD is very common. It usually starts in early childhood. Many adults don't realize they have it until their children are diagnosed. Then they become aware of their own symptoms. Doctors don't know what causes ADHD. But it often runs in families. ADHD can be treated with medicines, behavior training, and counseling. Treatment can improve your life. Follow-up care is a key part of your treatment and safety. Be sure to make and go to all appointments, and call your doctor if you are having problems. It's also a good idea to know your test results and keep a list of the medicines you take. How can you care for yourself at home? · Learn all you can about ADHD. This will help you and your family understand it better. · Take your medicines exactly as prescribed. Call your doctor if you think you are having a problem with your medicine. You will get more details on the specific medicines your doctor prescribes. · If you miss a dose of your medicine, do not take an extra dose. · If your doctor suggests counseling, find a counselor you like and trust. Talk openly and honestly. Be willing to make some changes. · Find a support group for adults with ADHD. Talking to others with the same problems can help you feel better. It can also give you ideas about how to best cope with the condition. · Get rid of distractions at your work space. Keep your desk clean. Try not to face a window or busy hallway. · Use files, planners, and other tools to keep you organized. · Limit use of alcohol, and do not use illegal drugs.  People with ADHD tend to become addicted more easily than others. Tell your doctor if you need help to quit. Counseling, support groups, and sometimes medicines can help you stay free of alcohol or drugs. · Get at least 30 minutes of physical activity on most days of the week. Exercise has been shown to help people cope with ADHD. Walking is a good choice. You also may want to do other activities, such as running, swimming, cycling, or playing tennis or team sports. When should you call for help? Watch closely for changes in your health, and be sure to contact your doctor if:    · You feel sad a lot or cry all the time.     · You have trouble sleeping, or you sleep too much.     · You find it hard to concentrate, make decisions, or remember things.     · You change how you normally eat.     · You feel guilty for no reason. Where can you learn more? Go to http://nikhil-tika.info/. Enter B196 in the search box to learn more about \"Attention Deficit Hyperactivity Disorder (ADHD) in Adults: Care Instructions. \"  Current as of: September 11, 2018  Content Version: 11.9  © 2341-3608 [employer]. Care instructions adapted under license by [employer] (which disclaims liability or warranty for this information). If you have questions about a medical condition or this instruction, always ask your healthcare professional. [place] [address] any warranty or liability for your use of this information.

## 2019-04-25 NOTE — PROGRESS NOTES
Chief Complaint   Patient presents with    Medication Refill     Methylphenidate     1. Have you been to the ER, urgent care clinic since your last visit? Hospitalized since your last visit? No      2. Have you seen or consulted any other health care providers outside of the [address] [place] [address] since your last visit? Include any pap smears or colon screening.  No    Visit Vitals  /86 (BP 1 Location: Left arm, BP Patient Position: Sitting)   Pulse 95   Temp 98.7 °F (37.1 °C) (Oral)   Resp 20   Ht 5' 10\" (1.778 m)   Wt 223 lb 6.4 oz (101.3 kg)   SpO2 97%   BMI 32.05 kg/m²

## 2019-04-26 NOTE — ASSESSMENT & PLAN NOTE
Stable on current dose. No side-effects noted at this time.  reviewed and consistent with fill history. Will refill medications for 3 months.

## 2019-09-03 ENCOUNTER — OFFICE VISIT (OUTPATIENT)
Dept: FAMILY MEDICINE CLINIC | Age: 29
End: 2019-09-03

## 2019-09-03 VITALS
WEIGHT: 230 LBS | OXYGEN SATURATION: 99 % | DIASTOLIC BLOOD PRESSURE: 83 MMHG | RESPIRATION RATE: 17 BRPM | SYSTOLIC BLOOD PRESSURE: 124 MMHG | BODY MASS INDEX: 32.93 KG/M2 | HEART RATE: 67 BPM | HEIGHT: 70 IN

## 2019-09-03 DIAGNOSIS — F98.8 ATTENTION DEFICIT DISORDER (ADD) WITHOUT HYPERACTIVITY: ICD-10-CM

## 2019-09-03 RX ORDER — METHYLPHENIDATE HYDROCHLORIDE 36 MG/1
36 TABLET ORAL
Refills: 0 | Status: CANCELLED | OUTPATIENT
Start: 2019-09-03

## 2019-09-03 RX ORDER — METHYLPHENIDATE HYDROCHLORIDE 36 MG/1
72 TABLET ORAL DAILY
Qty: 60 TAB | Refills: 0 | Status: SHIPPED | OUTPATIENT
Start: 2019-09-03 | End: 2020-02-04 | Stop reason: SDUPTHER

## 2019-09-03 RX ORDER — METHYLPHENIDATE HYDROCHLORIDE 36 MG/1
TABLET ORAL
Refills: 0 | COMMUNITY
Start: 2019-08-07 | End: 2019-09-03 | Stop reason: SDUPTHER

## 2019-09-03 NOTE — PATIENT INSTRUCTIONS
Attention Deficit Hyperactivity Disorder (ADHD) in Adults: Care Instructions  Your Care Instructions    Attention deficit hyperactivity disorder, or ADHD, is a condition that makes it hard to pay attention. So you may have problems when you try to focus, get organized, and finish tasks. It might make you more active than other people. Or you might do things without thinking first.  ADHD is very common. It usually starts in early childhood. Many adults don't realize they have it until their children are diagnosed. Then they become aware of their own symptoms. Doctors don't know what causes ADHD. But it often runs in families. ADHD can be treated with medicines, behavior training, and counseling. Treatment can improve your life. Follow-up care is a key part of your treatment and safety. Be sure to make and go to all appointments, and call your doctor if you are having problems. It's also a good idea to know your test results and keep a list of the medicines you take. How can you care for yourself at home? · Learn all you can about ADHD. This will help you and your family understand it better. · Take your medicines exactly as prescribed. Call your doctor if you think you are having a problem with your medicine. You will get more details on the specific medicines your doctor prescribes. · If you miss a dose of your medicine, do not take an extra dose. · If your doctor suggests counseling, find a counselor you like and trust. Talk openly and honestly. Be willing to make some changes. · Find a support group for adults with ADHD. Talking to others with the same problems can help you feel better. It can also give you ideas about how to best cope with the condition. · Get rid of distractions at your work space. Keep your desk clean. Try not to face a window or busy hallway. · Use files, planners, and other tools to keep you organized. · Limit use of alcohol, and do not use illegal drugs.  People with ADHD tend to develop substance use disorder more easily than others. Tell your doctor if you need help to quit. Counseling, support groups, and sometimes medicines can help you stay free of alcohol or drugs. · Get at least 30 minutes of physical activity on most days of the week. Exercise has been shown to help people cope with ADHD. Walking is a good choice. You also may want to do other activities, such as running, swimming, cycling, or playing tennis or team sports. When should you call for help? Watch closely for changes in your health, and be sure to contact your doctor if:    · You feel sad a lot or cry all the time.     · You have trouble sleeping, or you sleep too much.     · You find it hard to concentrate, make decisions, or remember things.     · You change how you normally eat.     · You feel guilty for no reason. Where can you learn more? Go to http://nikhil-tkia.info/. Enter B196 in the search box to learn more about \"Attention Deficit Hyperactivity Disorder (ADHD) in Adults: Care Instructions. \"  Current as of: September 11, 2018  Content Version: 12.1  © 9212-9775 [employer]. Care instructions adapted under license by [employer] (which disclaims liability or warranty for this information). If you have questions about a medical condition or this instruction, always ask your healthcare professional. [address] any warranty or liability for your use of this information.

## 2019-09-03 NOTE — PROGRESS NOTES
Chief Complaint   Patient presents with    Medication Refill     methylphendiate--36 mg--twice a day     1. Have you been to the ER, urgent care clinic since your last visit? Hospitalized since your last visit? No    2. Have you seen or consulted any other health care providers outside of the [address] [place] [address] since your last visit? Include any pap smears or colon screening.  No

## 2019-09-15 NOTE — PROGRESS NOTES
[address]: Progress Note  Tod Manning MD  9/15/2019   Chief Complaint   Patient presents with    Medication Refill     methylphendiate--36 mg--twice a day        Subjective   Jane Malloy is a 30 y.o. male who presents to clinic today for ADHD Follow-up. .    ADHD Medication compliance: weekends and school holidays off. · Patient's perception of whether/to what extent meds are effective:  Currently stable. 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 · New Stressors affecting patient:?  None    · If there is counseling or mental health involvement, is pt attending sessions? Effective? None at this time. Growth  Wt Readings from Last 3 Encounters:   09/03/19 230 lb (104.3 kg)   04/25/19 223 lb 6.4 oz (101.3 kg)   01/03/19 230 lb (104.3 kg)     Ht Readings from Last 3 Encounters:   09/03/19 5' 10\" (1.778 m)   04/25/19 5' 10\" (1.778 m)   01/03/19 5' 10\" (1.778 m)     Body mass index is 33 kg/m². Facility age limit for growth percentiles is 20 years. Facility age limit for growth percentiles is 20 years. Facility age limit for growth percentiles is 20 years. ROS:  · Appetite: Normal  · Sleep: Normal  · Headache: None  · Stomachache: None  · Tics: None  · Picking: None  · More Emotional: No  · Dull/listless: No  · Irritable: No   · Socially withdrawn: No  · Other: None     Patients past medical history and social history reviewed:  Past Medical History:   Diagnosis Date    ADHD (attention deficit hyperactivity disorder) 1997    neuro-evaluation done. on concerta.  ADHD (attention deficit hyperactivity disorder)      Social History     Tobacco Use    Smoking status: Never Smoker    Smokeless tobacco: Never Used   Substance Use Topics    Alcohol use: Yes     Alcohol/week: 1.0 - 2.0 standard drinks     Types: 1 - 2 Cans of beer per week    Drug use: No    Allergies and Medications reviewed and updated.   Current Outpatient Medications   Medication Sig Dispense Refill    Cetirizine (ZYRTEC) 10 mg cap Take  by mouth.  methylphenidate ER 36 mg 24 hr tab Take 2 Tabs by mouth daily for 30 days. Max Daily Amount: 72 mg. 60 Tab 0    methylphenidate ER 36 mg 24 hr tab Take 2 Tabs by mouth daily for 30 days. Max Daily Amount: 72 mg. 60 Tab 0    methylphenidate ER 36 mg 24 hr tab Take 2 Tabs by mouth daily for 30 days. Max Daily Amount: 72 mg. 60 Tab 0    ibuprofen (MOTRIN) 200 mg tablet Take 3 Tabs by mouth every six (6) hours as needed for Pain. 90 Tab 1    calcium carbonate (TUMS EXTRA STRENGTH SMOOTHIES) 300 mg (750 mg) chewable tablet Take 1 Tab by mouth daily. Objective     Visit Vitals  /83 (BP 1 Location: Right arm, BP Patient Position: Sitting)   Pulse 67   Resp 17   Ht 5' 10\" (1.778 m)   Wt 230 lb (104.3 kg)   SpO2 99%   BMI 33.00 kg/m²     Gen: Seated, NAD. Observation of Chandra's behaviors in the exam room included no unusual activities  HEENT: NC/AT, PERRLA, TM Normal Bilaterally, No thyromegally  Heart: RRR, no m/r/g  Lungs: CTA bilaterally  Abd: +BS, soft, NT, ND  Neuro: 2+ DTR, no tics noted         Assessment/Plan:   1. Attention deficit disorder (ADD) without hyperactivity  - methylphenidate ER 36 mg 24 hr tab; Take 2 Tabs by mouth daily for 30 days. Max Daily Amount: 72 mg. Dispense: 60 Tab; Refill: 0  - methylphenidate ER 36 mg 24 hr tab; Take 2 Tabs by mouth daily for 30 days. Max Daily Amount: 72 mg. Dispense: 60 Tab; Refill: 0  - methylphenidate ER 36 mg 24 hr tab; Take 2 Tabs by mouth daily for 30 days. Max Daily Amount: 72 mg. Dispense: 60 Tab; Refill: 0      I have discussed the diagnosis with the patient and parent(s) and the intended plan as seen in the above orders. he has expressed understanding. The patient has received an after-visit summary and questions were answered concerning future plans. I have discussed medication side effects and warnings with the patient as well.     Patient to return for follow-up appointment in 3 months    Electronically Signed: Mariama Obrien MD

## 2020-02-04 ENCOUNTER — OFFICE VISIT (OUTPATIENT)
Dept: FAMILY MEDICINE CLINIC | Age: 30
End: 2020-02-04

## 2020-02-04 VITALS
HEART RATE: 81 BPM | OXYGEN SATURATION: 98 % | WEIGHT: 223.6 LBS | TEMPERATURE: 98.1 F | SYSTOLIC BLOOD PRESSURE: 101 MMHG | HEIGHT: 70 IN | DIASTOLIC BLOOD PRESSURE: 69 MMHG | RESPIRATION RATE: 20 BRPM | BODY MASS INDEX: 32.01 KG/M2

## 2020-02-04 DIAGNOSIS — F98.8 ATTENTION DEFICIT DISORDER (ADD) WITHOUT HYPERACTIVITY: ICD-10-CM

## 2020-02-04 RX ORDER — METHYLPHENIDATE HYDROCHLORIDE 36 MG/1
72 TABLET ORAL DAILY
Qty: 60 TAB | Refills: 0 | Status: SHIPPED | OUTPATIENT
Start: 2020-04-06 | End: 2020-09-21 | Stop reason: SDUPTHER

## 2020-02-04 RX ORDER — METHYLPHENIDATE HYDROCHLORIDE 36 MG/1
72 TABLET ORAL DAILY
Qty: 60 TAB | Refills: 0 | Status: SHIPPED | OUTPATIENT
Start: 2020-03-06 | End: 2020-09-21 | Stop reason: SDUPTHER

## 2020-02-04 RX ORDER — METHYLPHENIDATE HYDROCHLORIDE 36 MG/1
72 TABLET ORAL DAILY
Qty: 60 TAB | Refills: 0 | Status: SHIPPED | OUTPATIENT
Start: 2020-02-04 | End: 2020-09-21 | Stop reason: SDUPTHER

## 2020-02-04 NOTE — PROGRESS NOTES
Chief Complaint   Patient presents with    Attention Deficit Disorder     1. Have you been to the ER, urgent care clinic since your last visit? Hospitalized since your last visit? No    2. Have you seen or consulted any other health care providers outside of the [address] [place] [address] since your last visit? Include any pap smears or colon screening. No     Reviewed record in preparation for visit and have obtained necessary documentation.

## 2020-02-04 NOTE — PROGRESS NOTES
[address]: Progress Note  Cristopher Quintanilla MD  2/4/2020   Chief Complaint   Patient presents with    Attention Deficit Disorder        Subjective   Georgian Anson is a 30 y.o. male who presents to clinic today for ADHD Follow-up. .    ADHD Medication compliance: weekends and school holidays off. · Patient's perception of whether/to what extent meds are effective:  Currently stable. 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 · New Stressors affecting patient:?  None    · If there is counseling or mental health involvement, is pt attending sessions? Effective? None at this time. Growth  Wt Readings from Last 3 Encounters:   02/04/20 223 lb 9.6 oz (101.4 kg)   09/03/19 230 lb (104.3 kg)   04/25/19 223 lb 6.4 oz (101.3 kg)     Ht Readings from Last 3 Encounters:   02/04/20 5' 10\" (1.778 m)   09/03/19 5' 10\" (1.778 m)   04/25/19 5' 10\" (1.778 m)     Body mass index is 32.08 kg/m². Facility age limit for growth percentiles is 20 years. Facility age limit for growth percentiles is 20 years. Facility age limit for growth percentiles is 20 years. ROS:  · Appetite: Normal  · Sleep: Normal  · Headache: None  · Stomachache: None  · Tics: None  · Picking: None  · More Emotional: No  · Dull/listless: No  · Irritable: No   · Socially withdrawn: No  · Other: None     Patients past medical history and social history reviewed:  Past Medical History:   Diagnosis Date    ADHD (attention deficit hyperactivity disorder) 1997    neuro-evaluation done. on concerta.  ADHD (attention deficit hyperactivity disorder)      Social History     Tobacco Use    Smoking status: Never Smoker    Smokeless tobacco: Never Used   Substance Use Topics    Alcohol use: Yes     Alcohol/week: 1.0 - 2.0 standard drinks     Types: 1 - 2 Cans of beer per week    Drug use: No    Allergies and Medications reviewed and updated.   Current Outpatient Medications   Medication Sig Dispense Refill    [START ON 4/6/2020] methylphenidate ER 36 mg 24 hr tab Take 2 Tabs by mouth daily for 30 days. Max Daily Amount: 72 mg. 60 Tab 0    [START ON 3/6/2020] methylphenidate ER 36 mg 24 hr tab Take 2 Tabs by mouth daily for 30 days. Max Daily Amount: 72 mg. 60 Tab 0    methylphenidate ER 36 mg 24 hr tab Take 2 Tabs by mouth daily for 30 days. Max Daily Amount: 72 mg. 60 Tab 0    Cetirizine (ZYRTEC) 10 mg cap Take  by mouth.  ibuprofen (MOTRIN) 200 mg tablet Take 3 Tabs by mouth every six (6) hours as needed for Pain. 90 Tab 1    calcium carbonate (TUMS EXTRA STRENGTH SMOOTHIES) 300 mg (750 mg) chewable tablet Take 1 Tab by mouth daily. Objective     Visit Vitals  /69 (BP 1 Location: Left arm, BP Patient Position: Sitting)   Pulse 81   Temp 98.1 °F (36.7 °C) (Oral)   Resp 20   Ht 5' 10\" (1.778 m)   Wt 223 lb 9.6 oz (101.4 kg)   SpO2 98%   BMI 32.08 kg/m²     Gen: Seated, NAD. Observation of Chandra's behaviors in the exam room included no unusual activities  HEENT: NC/AT, PERRLA, TM Normal Bilaterally, No thyromegally  Heart: RRR, no m/r/g  Lungs: CTA bilaterally  Abd: +BS, soft, NT, ND  Neuro: 2+ DTR, no tics noted         Assessment/Plan:   1. Attention deficit disorder (ADD) without hyperactivity  Stable. Refill x 3 months. - methylphenidate ER 36 mg 24 hr tab; Take 2 Tabs by mouth daily for 30 days. Max Daily Amount: 72 mg. Dispense: 60 Tab; Refill: 0  - methylphenidate ER 36 mg 24 hr tab; Take 2 Tabs by mouth daily for 30 days. Max Daily Amount: 72 mg. Dispense: 60 Tab; Refill: 0  - methylphenidate ER 36 mg 24 hr tab; Take 2 Tabs by mouth daily for 30 days. Max Daily Amount: 72 mg. Dispense: 60 Tab; Refill: 0        I have discussed the diagnosis with the patient and parent(s) and the intended plan as seen in the above orders. he has expressed understanding. The patient has received an after-visit summary and questions were answered concerning future plans.   I have discussed medication side effects and warnings with the patient as well.     Patient to return for follow-up appointment in 3 months    Electronically Signed: Emily Delatorre MD

## 2020-02-04 NOTE — PATIENT INSTRUCTIONS
Attention Deficit Hyperactivity Disorder (ADHD) in Adults: Care Instructions Your Care Instructions Attention deficit hyperactivity disorder, or ADHD, is a condition that makes it hard to pay attention. So you may have problems when you try to focus, get organized, and finish tasks. It might make you more active than other people. Or you might do things without thinking first. 
ADHD is very common. It usually starts in early childhood. Many adults don't realize they have it until their children are diagnosed. Then they become aware of their own symptoms. Doctors don't know what causes ADHD. But it often runs in families. ADHD can be treated with medicines, behavior training, and counseling. Treatment can improve your life. Follow-up care is a key part of your treatment and safety. Be sure to make and go to all appointments, and call your doctor if you are having problems. It's also a good idea to know your test results and keep a list of the medicines you take. How can you care for yourself at home? · Learn all you can about ADHD. This will help you and your family understand it better. · Take your medicines exactly as prescribed. Call your doctor if you think you are having a problem with your medicine. You will get more details on the specific medicines your doctor prescribes. · If you miss a dose of your medicine, do not take an extra dose. · If your doctor suggests counseling, find a counselor you like and trust. Talk openly and honestly. Be willing to make some changes. · Find a support group for adults with ADHD. Talking to others with the same problems can help you feel better. It can also give you ideas about how to best cope with the condition. · Get rid of distractions at your work space. Keep your desk clean. Try not to face a window or busy hallway. · Use files, planners, and other tools to keep you organized. · Limit use of alcohol, and do not use illegal drugs.  People with ADHD tend to develop substance use disorder more easily than others. Tell your doctor if you need help to quit. Counseling, support groups, and sometimes medicines can help you stay free of alcohol or drugs. · Get at least 30 minutes of physical activity on most days of the week. Exercise has been shown to help people cope with ADHD. Walking is a good choice. You also may want to do other activities, such as running, swimming, cycling, or playing tennis or team sports. When should you call for help? Watch closely for changes in your health, and be sure to contact your doctor if: 
  · You feel sad a lot or cry all the time.  
  · You have trouble sleeping, or you sleep too much.  
  · You find it hard to concentrate, make decisions, or remember things.  
  · You change how you normally eat.  
  · You feel guilty for no reason. Where can you learn more? Go to http://nikhil-tika.info/. Enter B196 in the search box to learn more about \"Attention Deficit Hyperactivity Disorder (ADHD) in Adults: Care Instructions. \" Current as of: May 28, 2019 Content Version: 12.2 © 8296-4010 [employer]. Care instructions adapted under license by [employer] (which disclaims liability or warranty for this information). If you have questions about a medical condition or this instruction, always ask your healthcare professional. [address] any warranty or liability for your use of this information.

## 2020-08-24 ENCOUNTER — TELEPHONE (OUTPATIENT)
Dept: FAMILY MEDICINE CLINIC | Age: 30
End: 2020-08-24

## 2020-08-24 NOTE — TELEPHONE ENCOUNTER
Patient seeking VV appt for 8/31/20 for adderall. 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 Dr. Eugenie Flores is only on schedule for remainder of month for clinic appt's. Can we change latest appt to VV for patient? He was willing to wait until first part of September, but schedule not available.       Pt KX/[phone]

## 2020-09-16 ENCOUNTER — VIRTUAL VISIT (OUTPATIENT)
Dept: FAMILY MEDICINE CLINIC | Age: 30
End: 2020-09-16
Payer: COMMERCIAL

## 2020-09-16 DIAGNOSIS — F98.8 ATTENTION DEFICIT DISORDER (ADD) WITHOUT HYPERACTIVITY: ICD-10-CM

## 2020-09-16 PROCEDURE — 99213 OFFICE O/P EST LOW 20 MIN: CPT | Performed by: FAMILY MEDICINE

## 2020-09-16 NOTE — PROGRESS NOTES
Robles Enamorado  26 y.o. male  1990  [address] [place] [address]  [id number]   [address]:    Telemedicine Progress Note  Leatha Chery MD       Encounter Date and Time: September 16, 2020 at 4:11 PM    Consent: Robles Enamorado, who was seen by synchronous (real-time) audio-video technology, and/or his healthcare decision maker, is aware that this patient-initiated, Telehealth encounter on 9/16/2020 is a billable service, with coverage as determined by his insurance carrier. He is aware that he may receive a bill and has provided verbal consent to proceed: Yes. Chief Complaint   Patient presents with    Attention Deficit Disorder     History of Present Illness   Robles Enamorado is a 26 y.o. male was evaluated by synchronous (real-time) audio-video technology from work, through a secure patient portal.      Subjective   Robles Enamorado is a 26 y.o. male who presents to clinic today for ADHD Follow-up. ADHD Medication compliance: Changes school schedule, patient taking medication intermittently throughout the week based on need. · Patient's perception of whether/to what extent meds are effective ([place], comments): When patient takes medication, is attention deficit symptoms, including inattention and difficulty completing tasks are improved. Denies side effect from medications. This is the case for both at home and at work. · New Stressors affecting patient? Transition to virtual learning and in person learning with Covid guidelines. · If there is counseling or mental health involvement, is pt attending sessions? Effective?   None      Growth  Wt Readings from Last 3 Encounters:   02/04/20 223 lb 9.6 oz (101.4 kg)   09/03/19 230 lb (104.3 kg)   04/25/19 223 lb 6.4 oz (101.3 kg)     Ht Readings from Last 3 Encounters:   02/04/20 5' 10\" (1.778 m)   09/03/19 5' 10\" (1.778 m)   04/25/19 5' 10\" (1.778 m)     There is no height or weight on file to calculate BMI.  No height and weight on file for this encounter. Facility age limit for growth percentiles is 20 years. Facility age limit for growth percentiles is 20 years. ROS:  · Appetite: Normal  · Sleep: Normal  · Headache: None  · Stomachache: None  · Tics: None  · Picking: None  · More Emotional: No  · Dull/listless: No  · Irritable: No   · Socially withdrawn: No  · Other: None     Patients past medical history and social history reviewed:  Past Medical History:   Diagnosis Date    ADHD (attention deficit hyperactivity disorder) 1997    neuro-evaluation done. on concerta.  ADHD (attention deficit hyperactivity disorder)      Social History     Tobacco Use    Smoking status: Never Smoker    Smokeless tobacco: Never Used   Substance Use Topics    Alcohol use: Yes     Alcohol/week: 1.0 - 2.0 standard drinks     Types: 1 - 2 Cans of beer per week    Drug use: No    Allergies and Medications reviewed and updated. Current Outpatient Medications   Medication Sig Dispense Refill    Cetirizine (ZYRTEC) 10 mg cap Take  by mouth.  ibuprofen (MOTRIN) 200 mg tablet Take 3 Tabs by mouth every six (6) hours as needed for Pain. 90 Tab 1    calcium carbonate (TUMS EXTRA STRENGTH SMOOTHIES) 300 mg (750 mg) chewable tablet Take 1 Tab by mouth daily. Objective     Patient-Reported Vitals 9/16/2020   Patient-Reported Weight 225   Patient-Reported Height 5'11\"   Patient-Reported Pulse 92   Patient-Reported Temperature 97.5      General:  Seated, NAD.  Observation of Chandra's behaviors in the exam room included no unusual activities   Mental  status: mental status: alert, oriented to person, place, and time, normal mood, behavior, speech, dress, motor activity, and thought processes   Resp: resp: normal effort and no respiratory distress   Neuro: neuro: no gross deficits   Skin: skin: no discoloration or lesions of concern on visible areas   Due to this being a TeleHealth evaluation, many elements of the physical examination are unable to be assessed. Neuro: 2+ DTR, no tics noted         1. Attention deficit disorder (ADD) without hyperactivity  Stable on his current dose of medications. Reported heart rate within goal.  Will monitor weight and blood pressure at home. Follow-up in 3 months. - methylphenidate ER 36 mg 24 hr tab; Take 2 Tabs by mouth daily for 30 days. Max Daily Amount: 72 mg. Dispense: 60 Tab; Refill: 0  - methylphenidate ER 36 mg 24 hr tab; Take 2 Tabs by mouth daily for 30 days. Max Daily Amount: 72 mg. Dispense: 60 Tab; Refill: 0  - methylphenidate ER 36 mg 24 hr tab; Take 2 Tabs by mouth daily for 30 days. Max Daily Amount: 72 mg. Dispense: 60 Tab; Refill: 0      I have discussed the diagnosis with the patient and parent(s) and the intended plan as seen in the above orders. he has expressed understanding. The patient has received an after-visit summary and questions were answered concerning future plans. I have discussed medication side effects and warnings with the patient as well. Patient to return for follow-up appointment in 3 months    Electronically Signed: Margarita Sumner MD      Pepper Jones is a 26 y.o. male who was evaluated by an audio-video encounter for concerns as above. Patient identification was verified prior to start of the visit. A caregiver was present when appropriate. Due to this being a TeleHealth encounter (During LCSGZ-55 public health emergency), evaluation of the following organ systems was limited: Vitals/Constitutional/EENT/Resp/CV/GI//MS/Neuro/Skin/Heme-Lymph-Imm. Pursuant to the emergency declaration under the [facility], 1135 waiver authority and the [employer] Act, this Virtual Visit was conducted, with patient's (and/or legal guardian's) consent, to reduce the patient's risk of exposure to COVID-19 and provide necessary medical care.      Services were provided through a synchronous discussion virtually to substitute for in-person clinic visit. I was at home. The patient was Office. History   Patients past medical, surgical and family histories were reviewed and updated. Past Medical History:   Diagnosis Date    ADHD (attention deficit hyperactivity disorder) 1997    neuro-evaluation done. on concerta.  ADHD (attention deficit hyperactivity disorder)      Past Surgical History:   Procedure Laterality Date    HX WISDOM TEETH EXTRACTION       Family History   Problem Relation Age of Onset    Tremors Mother     Arthritis-osteo Mother     Cancer Father     Lung Disease Father     Elevated Lipids Maternal Grandmother     Diabetes Maternal Grandmother     Arthritis-osteo Maternal Grandmother     Stroke Maternal Grandfather     Arthritis-osteo Paternal Grandmother     Elevated Lipids Paternal Grandfather      Social History     Socioeconomic History    Marital status:      Spouse name: Not on file    Number of children: Not on file    Years of education: Not on file    Highest education level: Not on file   Occupational History    Not on file   Social Needs    Financial resource strain: Not on file    Food insecurity     Worry: Not on file     Inability: Not on file    Transportation needs     Medical: Not on file     Non-medical: Not on file   Tobacco Use    Smoking status: Never Smoker    Smokeless tobacco: Never Used   Substance and Sexual Activity    Alcohol use:  Yes     Alcohol/week: 1.0 - 2.0 standard drinks     Types: 1 - 2 Cans of beer per week    Drug use: No    Sexual activity: Yes     Partners: Female   Lifestyle    Physical activity     Days per week: Not on file     Minutes per session: Not on file    Stress: Not on file   Relationships    Social connections     Talks on phone: Not on file     Gets together: Not on file     Attends Mu-ism service: Not on file     Active member of club or organization: Not on file Attends meetings of clubs or organizations: Not on file     Relationship status: Not on file    Intimate partner violence     Fear of current or ex partner: Not on file     Emotionally abused: Not on file     Physically abused: Not on file     Forced sexual activity: Not on file   Other Topics Concern    Not on file   Social History Narrative    Not on file     Patient Active Problem List   Diagnosis Code    ADD (attention deficit disorder) F80.6    FH: spinal stenosis Z82.69    DDD (degenerative disc disease), lumbar M51.36    Inflamed skin tag L91.8          Current Medications/Allergies   Medications and Allergies reviewed:    Current Outpatient Medications   Medication Sig Dispense Refill    Cetirizine (ZYRTEC) 10 mg cap Take  by mouth.  ibuprofen (MOTRIN) 200 mg tablet Take 3 Tabs by mouth every six (6) hours as needed for Pain. 90 Tab 1    calcium carbonate (TUMS EXTRA STRENGTH SMOOTHIES) 300 mg (750 mg) chewable tablet Take 1 Tab by mouth daily.        No Known Allergies

## 2020-09-21 RX ORDER — METHYLPHENIDATE HYDROCHLORIDE 36 MG/1
72 TABLET ORAL DAILY
Qty: 60 TAB | Refills: 0 | Status: SHIPPED | OUTPATIENT
Start: 2020-09-21 | End: 2020-10-15 | Stop reason: SDUPTHER

## 2020-09-21 RX ORDER — METHYLPHENIDATE HYDROCHLORIDE 36 MG/1
72 TABLET ORAL DAILY
Qty: 60 TAB | Refills: 0 | Status: SHIPPED | OUTPATIENT
Start: 2020-09-21 | End: 2021-02-08 | Stop reason: SDUPTHER

## 2020-10-15 RX ORDER — METHYLPHENIDATE HYDROCHLORIDE 36 MG/1
72 TABLET ORAL DAILY
Qty: 60 TAB | Refills: 0 | Status: SHIPPED | OUTPATIENT
Start: 2020-10-21 | End: 2021-02-08 | Stop reason: SDUPTHER

## 2020-10-15 RX ORDER — METHYLPHENIDATE HYDROCHLORIDE 36 MG/1
72 TABLET ORAL DAILY
Qty: 60 TAB | Refills: 0 | Status: SHIPPED | OUTPATIENT
Start: 2020-11-20 | End: 2021-02-08 | Stop reason: SDUPTHER

## 2021-02-08 ENCOUNTER — VIRTUAL VISIT (OUTPATIENT)
Dept: FAMILY MEDICINE CLINIC | Age: 31
End: 2021-02-08

## 2021-02-08 DIAGNOSIS — F98.8 ATTENTION DEFICIT DISORDER (ADD) WITHOUT HYPERACTIVITY: ICD-10-CM

## 2021-02-08 PROCEDURE — 99213 OFFICE O/P EST LOW 20 MIN: CPT | Performed by: FAMILY MEDICINE

## 2021-02-08 RX ORDER — METHYLPHENIDATE HYDROCHLORIDE 36 MG/1
72 TABLET ORAL DAILY
Qty: 60 TAB | Refills: 0 | Status: SHIPPED | OUTPATIENT
Start: 2021-03-10 | End: 2021-08-09 | Stop reason: SDUPTHER

## 2021-02-08 RX ORDER — METHYLPHENIDATE HYDROCHLORIDE 36 MG/1
72 TABLET ORAL DAILY
Qty: 60 TAB | Refills: 0 | Status: SHIPPED | OUTPATIENT
Start: 2021-02-08 | End: 2021-08-09 | Stop reason: SDUPTHER

## 2021-02-08 RX ORDER — METHYLPHENIDATE HYDROCHLORIDE 36 MG/1
72 TABLET ORAL DAILY
Qty: 60 TAB | Refills: 0 | Status: SHIPPED | OUTPATIENT
Start: 2021-04-09 | End: 2021-08-09 | Stop reason: SDUPTHER

## 2021-02-08 NOTE — PROGRESS NOTES
[address]: Progress Note  Mimi Sherman MD  2/8/2021   Chief Complaint   Patient presents with    Behavioral Problem        Subjective   Linda Crandall is a 26 y.o. male who presents to clinic today for ADHD Follow-up. Overall is doing well. No change in symptoms. Will take breaks from medication on weekends if needed      Growth  Wt Readings from Last 3 Encounters:   02/04/20 223 lb 9.6 oz (101.4 kg)   09/03/19 230 lb (104.3 kg)   04/25/19 223 lb 6.4 oz (101.3 kg)     Ht Readings from Last 3 Encounters:   02/04/20 5' 10\" (1.778 m)   09/03/19 5' 10\" (1.778 m)   04/25/19 5' 10\" (1.778 m)     There is no height or weight on file to calculate BMI. No height and weight on file for this encounter. Facility age limit for growth percentiles is 20 years. Facility age limit for growth percentiles is 20 years. ROS:  · Appetite: Normal  · Sleep: Normal  · Headache: None  · Stomachache: None  · Tics: None  · Picking: None  · More Emotional: No  · Dull/listless: No  · Irritable: No   · Socially withdrawn: No  · Other: None     Patients past medical history and social history reviewed:  Past Medical History:   Diagnosis Date    ADHD (attention deficit hyperactivity disorder) 1997    neuro-evaluation done. on concerta.  ADHD (attention deficit hyperactivity disorder)      Social History     Tobacco Use    Smoking status: Never Smoker    Smokeless tobacco: Never Used   Substance Use Topics    Alcohol use: Yes     Alcohol/week: 1.0 - 2.0 standard drinks     Types: 1 - 2 Cans of beer per week    Drug use: No    Allergies and Medications reviewed and updated. Current Outpatient Medications   Medication Sig Dispense Refill    Cetirizine (ZYRTEC) 10 mg cap Take  by mouth.  ibuprofen (MOTRIN) 200 mg tablet Take 3 Tabs by mouth every six (6) hours as needed for Pain. 90 Tab 1    calcium carbonate (TUMS EXTRA STRENGTH SMOOTHIES) 300 mg (750 mg) chewable tablet Take 1 Tab by mouth daily. Objective     Patient-Reported Vitals 2/8/2021   Patient-Reported Weight 225 lbs   Patient-Reported Height 5' 11\"   Patient-Reported Pulse -   Patient-Reported Temperature 96.8      Gen: Seated, NAD. Observation of Chandra's behaviors no unusual activities  Respiratory: No respiratory distress. Speaking full sentences  Neuro: No abnormal movements. Psych: Alert and oriented. No abnormal behaviors. 1. Attention deficit disorder (ADD) without hyperactivity  Reviewed  consistent with medication fill history. Stable on current dose of methylphenidate. Will refill at this time. - methylphenidate ER 36 mg 24 hr tab; Take 2 Tabs by mouth daily for 30 days. Max Daily Amount: 72 mg. Dispense: 60 Tab; Refill: 0  - methylphenidate ER 36 mg 24 hr tab; Take 2 Tabs by mouth daily for 30 days. Max Daily Amount: 72 mg. Dispense: 60 Tab; Refill: 0  - methylphenidate ER 36 mg 24 hr tab; Take 2 Tabs by mouth daily for 30 days. Max Daily Amount: 72 mg. Dispense: 60 Tab; Refill: 0      I have discussed the diagnosis with the patient and the intended plan as seen in the above orders. he has expressed understanding. The patient has received an after-visit summary and questions were answered concerning future plans. I have discussed medication side effects and warnings with the patient as well. Follow-up and Dispositions    · Return in about 3 months (around 5/8/2021). Electronically Signed: Derick Trejo MD    I have discussed the diagnosis with the patient and parent(s) and the intended plan as seen in the above orders. he has expressed understanding. The patient has received an after-visit summary and questions were answered concerning future plans. I have discussed medication side effects and warnings with the patient as well.     Electronically Signed: Derick Trejo MD

## 2021-07-13 ENCOUNTER — TELEPHONE (OUTPATIENT)
Dept: FAMILY MEDICINE CLINIC | Age: 31
End: 2021-07-13

## 2021-07-13 NOTE — TELEPHONE ENCOUNTER
Called and informed patient that Dr. Betsy Hopkins does not have any available appointments for July and August schedule is not open yet. Offered scheduling him with another provider. He would like to only see Dr. Betsy Hopkins and will call back next week to see if August schedule is open.

## 2021-07-13 NOTE — TELEPHONE ENCOUNTER
----- Message from [place] sent at 7/13/2021 11:11 AM EDT -----  Regarding: Dr. Hanna Bernal  Appointment not available    Caller's first and last name and relationship to patient (if not the patient):      Best contact number:[phone]      Preferred date and time:  first available       Scheduled appointment date and time:  n/a       Reason for appointment:  follow up for med refill       Details to clarify the request:  No routine appt's available for Dr. Ryder Blue.        [place]

## 2021-08-09 ENCOUNTER — OFFICE VISIT (OUTPATIENT)
Dept: FAMILY MEDICINE CLINIC | Age: 31
End: 2021-08-09
Payer: COMMERCIAL

## 2021-08-09 VITALS
OXYGEN SATURATION: 100 % | BODY MASS INDEX: 32.34 KG/M2 | RESPIRATION RATE: 16 BRPM | HEIGHT: 71 IN | SYSTOLIC BLOOD PRESSURE: 115 MMHG | DIASTOLIC BLOOD PRESSURE: 78 MMHG | HEART RATE: 72 BPM | WEIGHT: 231 LBS | TEMPERATURE: 97.9 F

## 2021-08-09 DIAGNOSIS — F98.8 ATTENTION DEFICIT DISORDER (ADD) WITHOUT HYPERACTIVITY: ICD-10-CM

## 2021-08-09 PROCEDURE — 99213 OFFICE O/P EST LOW 20 MIN: CPT | Performed by: FAMILY MEDICINE

## 2021-08-09 RX ORDER — METHYLPHENIDATE HYDROCHLORIDE 36 MG/1
72 TABLET ORAL DAILY
Qty: 60 TABLET | Refills: 0 | Status: SHIPPED | OUTPATIENT
Start: 2021-08-09 | End: 2022-11-01 | Stop reason: SDUPTHER

## 2021-08-09 RX ORDER — METHYLPHENIDATE HYDROCHLORIDE 36 MG/1
TABLET ORAL
COMMUNITY
Start: 2021-06-02 | End: 2021-12-20 | Stop reason: SDUPTHER

## 2021-08-09 RX ORDER — METHYLPHENIDATE HYDROCHLORIDE 36 MG/1
72 TABLET ORAL DAILY
Qty: 60 TABLET | Refills: 0 | Status: SHIPPED | OUTPATIENT
Start: 2021-08-09 | End: 2021-08-09 | Stop reason: SDUPTHER

## 2021-08-09 RX ORDER — METHYLPHENIDATE HYDROCHLORIDE 36 MG/1
72 TABLET ORAL DAILY
Qty: 60 TABLET | Refills: 0 | Status: SHIPPED | OUTPATIENT
Start: 2021-09-08 | End: 2022-11-01 | Stop reason: SDUPTHER

## 2021-08-09 NOTE — PROGRESS NOTES
[address]: Progress Note  Hiram Simpson MD  8/9/2021   Chief Complaint   Patient presents with    Follow-up     Patient states he is here for a follow up and to get his Concerta refilled. Subjective   Celio Mendoza is a 33 y.o. male who presents to clinic today for ADHD Follow-up. Had TD  Had [address] Vaccine    ADHD Medication compliance: all of the time    · Patient's perception of whether/to what extent meds are effective ([place], comments): Well controlled on current dose. 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 Effective? N/A      Growth  Wt Readings from Last 3 Encounters:   08/09/21 231 lb (104.8 kg)   02/04/20 223 lb 9.6 oz (101.4 kg)   09/03/19 230 lb (104.3 kg)     Ht Readings from Last 3 Encounters:   08/09/21 5' 11\" (1.803 m)   02/04/20 5' 10\" (1.778 m)   09/03/19 5' 10\" (1.778 m)     Body mass index is 32.22 kg/m². Facility age limit for growth percentiles is 20 years. Facility age limit for growth percentiles is 20 years. Facility age limit for growth percentiles is 20 years. ROS:  · Appetite: Normal  · Sleep: Normal  · Headache: None  · Stomachache: None  · Tics: None  · Picking: None  · More Emotional: No  · Dull/listless: No  · Irritable: No   · Socially withdrawn: No  · Other: none     Patients past medical history and social history reviewed:  Past Medical History:   Diagnosis Date    ADHD (attention deficit hyperactivity disorder) 1997    neuro-evaluation done. on concerta. 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 Current Outpatient Medications   Medication Sig Dispense Refill    methylphenidate ER 36 mg 24 hr tab TAKE 2 TABLETS BY MOUTH EVERY DAY      Cetirizine (ZYRTEC) 10 mg cap Take  by mouth.  calcium carbonate (TUMS EXTRA STRENGTH SMOOTHIES) 300 mg (750 mg) chewable tablet Take 1 Tab by mouth daily. Objective     Visit Vitals  /78 (BP 1 Location: Right arm, BP Patient Position: Sitting, BP Cuff Size: Adult long)   Pulse 72   Temp 97.9 °F (36.6 °C) (Temporal)   Resp 16   Ht 5' 11\" (1.803 m)   Wt 231 lb (104.8 kg)   SpO2 100%   BMI 32.22 kg/m²     Gen: Seated, NAD. Observation of Chandra's behaviors in the exam room included no unusual activities  HEENT: NC/AT, PERRLA, TM Normal Bilaterally, No thyromegally  Heart: RRR, no m/r/g  Lungs: CTA bilaterally  Abd: +BS, soft, NT, ND  Neuro: 2+ DTR, no tics noted         Assessment/Plan:   1. Attention deficit disorder (ADD) without hyperactivity  Will refill. UDS today. Updated medication use agreement.   - DRUG SCREEN, URINE; Future  - methylphenidate ER 36 mg 24 hr tab; Take 2 Tablets by mouth daily for 30 days. Max Daily Amount: 72 mg. Dispense: 60 Tablet; Refill: 0  - methylphenidate ER 36 mg 24 hr tab; Take 2 Tablets by mouth daily for 30 days. Max Daily Amount: 72 mg. Dispense: 60 Tablet; Refill: 0  - DRUG SCREEN, URINE  - methylphenidate ER 36 mg 24 hr tab; Take 2 Tablets by mouth daily for 30 days. Max Daily Amount: 72 mg. Dispense: 60 Tablet; Refill: 0    I have discussed the diagnosis with the patient and parent(s) and the intended plan as seen in the above orders. he has expressed understanding. The patient has received an after-visit summary and questions were answered concerning future plans. I have discussed medication side effects and warnings with the patient as well.     Follow-up and Dispositions    · Return in 3 months (on 11/9/2021), or if symptoms worsen or fail to improve.        Electronically Signed: Jake Becerril MD

## 2021-08-09 NOTE — PROGRESS NOTES
Chief Complaint   Patient presents with    Follow-up     Patient states he is here for a follow up and to get his Concerta refilled.

## 2021-08-10 LAB
AMPHET UR QL SCN: NEGATIVE
BARBITURATES UR QL SCN: NEGATIVE
BENZODIAZ UR QL: NEGATIVE
CANNABINOIDS UR QL SCN: NEGATIVE
COCAINE UR QL SCN: NEGATIVE
DRUG SCRN COMMENT,DRGCM: NORMAL
METHADONE UR QL: NEGATIVE
OPIATES UR QL: NEGATIVE
PCP UR QL: NEGATIVE

## 2021-10-22 ENCOUNTER — OFFICE VISIT (OUTPATIENT)
Dept: FAMILY MEDICINE CLINIC | Age: 31
End: 2021-10-22
Payer: COMMERCIAL

## 2021-10-22 ENCOUNTER — TELEPHONE (OUTPATIENT)
Dept: FAMILY MEDICINE CLINIC | Age: 31
End: 2021-10-22

## 2021-10-22 VITALS
OXYGEN SATURATION: 96 % | RESPIRATION RATE: 16 BRPM | HEIGHT: 71 IN | BODY MASS INDEX: 31.61 KG/M2 | WEIGHT: 225.8 LBS | HEART RATE: 73 BPM | SYSTOLIC BLOOD PRESSURE: 101 MMHG | DIASTOLIC BLOOD PRESSURE: 69 MMHG | TEMPERATURE: 98.7 F

## 2021-10-22 DIAGNOSIS — K40.90 INGUINAL HERNIA OF LEFT SIDE WITHOUT OBSTRUCTION OR GANGRENE: Primary | ICD-10-CM

## 2021-10-22 DIAGNOSIS — K42.9 UMBILICAL HERNIA WITHOUT OBSTRUCTION AND WITHOUT GANGRENE: ICD-10-CM

## 2021-10-22 PROCEDURE — 99213 OFFICE O/P EST LOW 20 MIN: CPT | Performed by: STUDENT IN AN ORGANIZED HEALTH CARE EDUCATION/TRAINING PROGRAM

## 2021-10-22 RX ORDER — CEFDINIR 300 MG/1
300 CAPSULE ORAL DAILY
COMMUNITY
End: 2021-12-20 | Stop reason: ALTCHOICE

## 2021-10-22 NOTE — PROGRESS NOTES
Mari Stone is a 33 y.o. male    Chief Complaint   Patient presents with    Mass     Patient is coming in because he started this week with swelling around his hip on the left side. He has stinging and numness in the area. He also had pressure and had problems urinating. he states that this got worst on wednesday. Yesterday when he was moving alot he did not feel it. He states that he can push iut can back in. No other concerns. 1. Have you been to the ER, urgent care clinic since your last visit? Hospitalized since your last visit? No    2. Have you seen or consulted any other health care providers outside of the [address] [place] [address] since your last visit? Include any pap smears or colon screening. No      Visit Vitals  /69 (BP 1 Location: Right upper arm, BP Patient Position: Sitting)   Pulse 73   Temp 98.7 °F (37.1 °C) (Oral)   Resp 16   Ht 5' 11\" (1.803 m)   Wt 225 lb 12.8 oz (102.4 kg)   SpO2 96%   BMI 31.49 kg/m²           Health Maintenance Due   Topic Date Due    Hepatitis C Screening  Never done    COVID-19 Vaccine (1) Never done    Flu Vaccine (1) Never done         Medication Reconciliation completed, changes noted.   Please  Update medication list.

## 2021-10-22 NOTE — TELEPHONE ENCOUNTER
PT. Is requesting for Dr. Mary Leary to fax over todays office visit notes to Dr. Calista Carlos office.

## 2021-10-22 NOTE — PATIENT INSTRUCTIONS

## 2021-10-22 NOTE — PROGRESS NOTES
Subjective   Milagro Banda is a 33 y.o. male who presents for Mass (Patient is coming in because he started this week with swelling around his hip on the left side. He has stinging and numness in the area. He also had pressure and had problems urinating. he states that this got worst on wednesday. Yesterday when he was moving alot he did not feel it. He states that he can push iut can back in. No other concerns. )    L sided abdominal Pain  He has been experiencing pain in his LLQ for about 2-3 weeks. About 2 days ago, he felt increased pressure, with numbing and stinging pain. He feels like he can \"pop something back in\". The pain is worst when lifting and moving. His bowel movements have been looser on Cefdinir, but he has not had any constipation and still passes gas. Bowel movements have been more frequent but smaller. Review of Systems   Review of Systems   Constitutional: Negative for chills and fever. HENT: Negative for congestion and sore throat. Eyes: Negative for discharge and redness. Respiratory: Negative for cough and shortness of breath. Cardiovascular: Negative for chest pain and palpitations. Gastrointestinal: Negative for diarrhea and vomiting. Endocrine: Negative for polydipsia and polyuria. Genitourinary: Positive for frequency. Negative for dysuria and hematuria. Skin: Negative for rash and wound. Neurological: Negative for weakness and numbness. Medical History  Past Medical History:   Diagnosis Date    ADHD (attention deficit hyperactivity disorder) 1997    neuro-evaluation done. on concerta. 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  methylphenidate ER 36 mg 24 hr tab TAKE 2 TABLETS BY MOUTH EVERY DAY    Cetirizine (ZYRTEC) 10 mg cap Take  by mouth.     calcium carbonate (TUMS EXTRA STRENGTH SMOOTHIES) 300 mg (750 mg) chewable tablet Take 1 Tab by mouth daily. No current facility-administered medications for this visit. Immunizations   Immunization History   Administered Date(s) Administered    Tdap 06/15/2021       Allergies   No Known Allergies    Objective   Vital Signs  Visit Vitals  /69 (BP 1 Location: Right upper arm, BP Patient Position: Sitting)   Pulse 73   Temp 98.7 °F (37.1 °C) (Oral)   Resp 16   Ht 5' 11\" (1.803 m)   Wt 225 lb 12.8 oz (102.4 kg)   SpO2 96%   BMI 31.49 kg/m²       Physical Examination  Physical Exam  Constitutional:       General: He is not in acute distress. Appearance: Normal appearance. HENT:      Head: Normocephalic and atraumatic. Right Ear: External ear normal.      Left Ear: External ear normal.   Eyes:      General: No scleral icterus. Extraocular Movements: Extraocular movements intact. Conjunctiva/sclera: Conjunctivae normal.   Cardiovascular:      Rate and Rhythm: Normal rate and regular rhythm. Pulses: Normal pulses. Heart sounds: Normal heart sounds. Pulmonary:      Effort: Pulmonary effort is normal. No respiratory distress. Breath sounds: Normal breath sounds. Abdominal:      General: Abdomen is flat. There is no distension. Palpations: Abdomen is soft. Tenderness: There is abdominal tenderness (L inguinal canal). Hernia: A hernia (Umbilical, inguinal) is present. Skin:     General: Skin is warm and dry. Findings: No bruising or erythema. Neurological:      General: No focal deficit present. Mental Status: He is alert and oriented to person, place, and time. Psychiatric:         Mood and Affect: Mood normal.         Behavior: Behavior normal.          Assessment and Plan   Liseth Liu is a 33 y.o. male who presents for Mass (Patient is coming in because he started this week with swelling around his hip on the left side. He has stinging and numness in the area.  He also had pressure and had problems urinating. he states that this got worst on wednesday. Yesterday when he was moving alot he did not feel it. He states that he can push iut can back in. No other concerns. )    1. Inguinal hernia of left side without obstruction or gangrene  2. Umbilical hernia without obstruction and without gangrene  Patient presents with a few weeks of LLQ pain, exacerbated by lifting. He feels a mass that is able to be reduced. No mass noted on exam, but he reports it was currently reduced, and he was quite tender in the L inguinal canal. Counseled patient that surgery was definitive treatment for hernias. Also discussed risk of strangulation and what signs to look for that would require emergent intervention. Will refer to Dr. Jamari Quintana, General Surgery, for evaluation.  - REFERRAL TO GENERAL SURGERY      ICD-10-CM ICD-9-CM    1. Inguinal hernia of left side without obstruction or gangrene  K40.90 550.90 REFERRAL TO GENERAL SURGERY   2. Umbilical hernia without obstruction and without gangrene  K42.9 553.1        Follow-up and Dispositions    · Return for As needed. Pt was discussed with Dr. Liam Kincaid (attending physician). I have reviewed patient medical and social history and medications. I have reviewed pertinent labs results and other data. I have discussed the diagnosis with the patient and the intended plan as seen in the above orders. The patient has received an after-visit summary and questions were answered concerning future plans. I have discussed medication side effects and warnings with the patient as well.     Maya Cochran MD  Resident, [facility]  10/22/21

## 2021-12-20 ENCOUNTER — OFFICE VISIT (OUTPATIENT)
Dept: FAMILY MEDICINE CLINIC | Age: 31
End: 2021-12-20
Payer: COMMERCIAL

## 2021-12-20 VITALS
HEART RATE: 81 BPM | DIASTOLIC BLOOD PRESSURE: 75 MMHG | SYSTOLIC BLOOD PRESSURE: 111 MMHG | TEMPERATURE: 98 F | HEIGHT: 71 IN | RESPIRATION RATE: 18 BRPM | BODY MASS INDEX: 31.36 KG/M2 | OXYGEN SATURATION: 97 % | WEIGHT: 224 LBS

## 2021-12-20 DIAGNOSIS — F98.8 ATTENTION DEFICIT DISORDER (ADD) WITHOUT HYPERACTIVITY: Primary | ICD-10-CM

## 2021-12-20 PROCEDURE — 99213 OFFICE O/P EST LOW 20 MIN: CPT | Performed by: STUDENT IN AN ORGANIZED HEALTH CARE EDUCATION/TRAINING PROGRAM

## 2021-12-20 RX ORDER — METHYLPHENIDATE HYDROCHLORIDE 36 MG/1
72 TABLET ORAL DAILY
Qty: 60 TABLET | Refills: 0 | Status: SHIPPED | OUTPATIENT
Start: 2022-02-20 | End: 2022-01-11

## 2021-12-20 RX ORDER — METHYLPHENIDATE HYDROCHLORIDE 36 MG/1
72 TABLET ORAL
Qty: 60 TABLET | Refills: 0 | Status: SHIPPED | OUTPATIENT
Start: 2022-01-20 | End: 2022-01-11

## 2021-12-20 RX ORDER — METHYLPHENIDATE HYDROCHLORIDE 36 MG/1
72 TABLET ORAL
Qty: 60 TABLET | Refills: 0 | Status: SHIPPED | OUTPATIENT
Start: 2021-12-20

## 2021-12-20 NOTE — PROGRESS NOTES
Identified Patient with two Patient identifiers (Name and ). Two Patient Identifiers confirmed. Reviewed record in preparation for visit and have obtained necessary documentation. Chief Complaint   Patient presents with    Behavioral Problem     ADHD follow up and medication refill       Visit Vitals  /75 (BP 1 Location: Right arm, BP Patient Position: Sitting, BP Cuff Size: Large adult)   Pulse 81   Temp 98 °F (36.7 °C) (Oral)   Resp 18   Ht 5' 11\" (1.803 m)   Wt 224 lb (101.6 kg)   SpO2 97%   BMI 31.24 kg/m²       1. Have you been to the ER, urgent care clinic since your last visit? Hospitalized since your last visit? No    2. Have you seen or consulted any other health care providers outside of the [address] [place] [address] since your last visit? Include any pap smears or colon screening.  No

## 2021-12-20 NOTE — PATIENT INSTRUCTIONS

## 2021-12-20 NOTE — PROGRESS NOTES
CC: ADHD Follow up  Tien Liu is an 33 y.o. male who presents for ADHD follow up. Patient states ADHS is well controlled on current dose of Concerta. Patient is a  and takes Concerta only when requiring focus. Usually does not need Concerta on weekends and holidays. Denies loss of appeitite, insomnia, headaches, dizziness, N/V, palpitations. Review of Systems   Review of Systems   Constitutional: Negative for appetite change. Respiratory: Negative for shortness of breath. Cardiovascular: Negative for palpitations. Gastrointestinal: Negative for nausea and vomiting. Neurological: Negative for dizziness and headaches. Psychiatric/Behavioral: The patient is not nervous/anxious. Allergies   No Known Allergies    Medications  Current Outpatient Medications   Medication Sig    [START ON 2/20/2022] methylphenidate ER 36 mg 24 hr tab Take 2 Tablets by mouth daily. Max Daily Amount: 72 mg.    [START ON 1/20/2022] methylphenidate ER 36 mg 24 hr tab Take 2 Tablets by mouth every morning. Max Daily Amount: 72 mg.  methylphenidate ER 36 mg 24 hr tab Take 2 Tablets by mouth every morning. Max Daily Amount: 72 mg.  Cetirizine (ZYRTEC) 10 mg cap Take  by mouth.  calcium carbonate (TUMS EXTRA STRENGTH SMOOTHIES) 300 mg (750 mg) chewable tablet Take 1 Tab by mouth daily. No current facility-administered medications for this visit. Medical History  Past Medical History:   Diagnosis Date    ADHD (attention deficit hyperactivity disorder) 1997    neuro-evaluation done. on concerta.     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 HENT:      Head: Normocephalic and atraumatic. Cardiovascular:      Rate and Rhythm: Normal rate and regular rhythm. Pulses: Normal pulses. Heart sounds: Normal heart sounds. Pulmonary:      Effort: Pulmonary effort is normal.      Breath sounds: Normal breath sounds. Abdominal:      General: Abdomen is flat. Palpations: Abdomen is soft. Skin:     General: Skin is warm and dry. Neurological:      Mental Status: He is alert. Psychiatric:         Mood and Affect: Mood normal.         Behavior: Behavior normal.          Assessment and Plan   Antonio Sebastian is a 33 y.o. who presents for ADHD follow up    1. Attention deficit disorder (ADD) without hyperactivity: UDS neg 8/9 2. PDMP reviewed and appropriate. - methylphenidate ER 36 mg 24 hr tab; Take 2 Tablets by mouth daily. Max Daily Amount: 72 mg. Dispense: 60 Tablet; Refill: 0  - methylphenidate ER 36 mg 24 hr tab; Take 2 Tablets by mouth every morning. Max Daily Amount: 72 mg. Dispense: 60 Tablet; Refill: 0  - methylphenidate ER 36 mg 24 hr tab; Take 2 Tablets by mouth every morning. Max Daily Amount: 72 mg. Dispense: 60 Tablet; Refill: 0  - Follow up in 3 months for annual exam and ADHD follow up        Follow-up and Dispositions    · Return in about 3 months (around 3/20/2022) for ADHD follow up and annual exam .           I have discussed the aforementioned diagnoses and plan with the patient in detail. I have provided information in person and/or in AVS. All questions answered prior to discharge.       I discussed this patient with Dr. Brandi Huerta (Attending Physician)   Signed By:  Leida Kirby DO    Family Medicine Resident

## 2022-01-11 ENCOUNTER — HOSPITAL ENCOUNTER (OUTPATIENT)
Dept: PREADMISSION TESTING | Age: 32
Discharge: HOME | End: 2022-01-11

## 2022-01-11 VITALS
RESPIRATION RATE: 16 BRPM | OXYGEN SATURATION: 97 % | HEART RATE: 91 BPM | BODY MASS INDEX: 31.91 KG/M2 | TEMPERATURE: 97.5 F | HEIGHT: 71 IN | SYSTOLIC BLOOD PRESSURE: 119 MMHG | DIASTOLIC BLOOD PRESSURE: 81 MMHG | WEIGHT: 227.96 LBS

## 2022-01-11 RX ORDER — BISMUTH SUBSALICYLATE 262 MG/1
2 TABLET, CHEWABLE ORAL AS NEEDED
COMMUNITY

## 2022-01-11 RX ORDER — IBUPROFEN 200 MG
200 CAPSULE ORAL AS NEEDED
COMMUNITY

## 2022-01-11 RX ORDER — FLUTICASONE PROPIONATE 50 MCG
2 SPRAY, SUSPENSION (ML) NASAL AS NEEDED
COMMUNITY

## 2022-01-11 NOTE — PERIOP NOTES
[address] [facility] [address], [address] [facility]   MAIN OR                                  [phone]   MAIN PRE OP                          [phone]                                                                                AMBULATORY PRE OP          [phone]  PRE-ADMISSION TESTING    [phone]   Surgery Date:  Friday 1/21/22       Is surgery arrival time given by surgeon? YES  NO  If NO, [facility] staff will call you between 3 and 7pm the day before your surgery with your arrival time. (If your surgery is on a Monday, we will call you the Friday before.)    Call [phone] after 7pm Monday-Friday if you did not receive this call. INSTRUCTIONS BEFORE YOUR SURGERY   When You  Arrive Arrive at the [address] [facility] on the day of your surgery  Have your insurance card, photo ID, and any copayment (if needed)   Food   and   Drink NO food or drink after midnight the night before surgery    This means NO water, gum, mints, coffee, juice, etc.  No alcohol (beer, wine, liquor) 24 hours before and after surgery   Medications to   TAKE   Morning of Surgery MEDICATIONS TO TAKE THE MORNING OF SURGERY WITH A SIP OF WATER:       Medications  To  STOP      7 days before surgery  Non-Steroidal anti-inflammatory Drugs (NSAID's): for example, Ibuprofen (Advil, Motrin), Naproxen (Aleve)   Aspirin, if taking for pain    Herbal supplements, vitamins, and fish oil   Other:  (Pain medications not listed above, including Tylenol may be taken)   Blood  Thinners  If you take  Aspirin, Plavix, Coumadin, or any blood-thinning or anti-blood clot medicine, talk to the doctor who prescribed the medications for pre-operative instructions.    Bathing Clothing  Jewelry  Valuables      If you shower the morning of surgery, please do not apply anything to your skin (lotions, powders, deodorant, or makeup, especially mascara)   Follow Chlorhex[employer] body wash instructions provided to you during PAT appointment. Begin 3 days prior to surgery.  Do not shave or trim anywhere 24 hours before surgery   Wear your hair loose or down; no pony-tails, buns, or metal hair clips   Wear loose, comfortable, clean clothes   Wear glasses instead of contacts  [employer] money, valuables, and jewelry, including body piercings, at home   If you were given an [employer], bring it on day of surgery. Going Home - or Spending the Night  SAME-DAY SURGERY: You must have a responsible adult drive you home and stay with you 24 hours after surgery   ADMITS: If your doctor is keeping you in the hospital after surgery, leave personal belongings/luggage in your car until you have a hospital room number. Hospital discharge time is 12 noon  Drivers must be here before 12 noon unless you are told differently   Special Instructions It is now mandated that all surgical patients be tested for COVID-19 prior to surgery. Testing has to be exactly 4 days prior to surgery. Your COVID test date is 1/17/22 between 8:00 am and 11:00 am.       COVID testing will be performed curbside at the [facility] [address]. There will be signs leading you to the testing site. You will need to bring a photo ID with you to be swabbed. Patients are advised to self-quarantine at home after testing and prior to your surgery date. You will be notified if your results are positive.     What to watch for:   Coronavirus (COVID-19) affects different people in different ways   It also appears with a wide range of symptoms from mild to severe   Signs usually appear 2-14 days after exposure     If you develop any of the following, notify your doctor immediately:  o Fever  o Chills, with or without a shiver  o Muscle pain  o Headache  o Sore throat  o Dry cough  o New loss of taste or smell  o Tiredness      If you develop any of the following, call 911:  o Shortness of breath  o Difficulty breathing  o Chest pain  o New confusion  o Blueness of fingers and/or lips       Follow all instructions so your surgery wont be cancelled. Please, be on time. If a situation occurs and you are delayed the day of surgery, call [phone]     If your physical condition changes (like a fever, cold, flu, etc.) call your surgeon. Home medication(s) reviewed and verified via      LIST   VERBAL   during PAT appointment. The patient was contacted by    IN-PERSON  The patient verbalizes understanding of all instructions and     DOES NOT   need reinforcement.

## 2022-01-17 ENCOUNTER — HOSPITAL ENCOUNTER (OUTPATIENT)
Dept: PREADMISSION TESTING | Age: 32
Discharge: HOME | End: 2022-01-17
Payer: COMMERCIAL

## 2022-01-17 PROCEDURE — U0005 INFEC AGEN DETEC AMPLI PROBE: HCPCS

## 2022-01-18 LAB
SARS-COV-2, XPLCVT: NOT DETECTED
SOURCE, COVRS: NORMAL

## 2022-01-20 ENCOUNTER — ANESTHESIA EVENT (OUTPATIENT)
Dept: SURGERY | Age: 32
End: 2022-01-20
Payer: COMMERCIAL

## 2022-01-21 ENCOUNTER — ANESTHESIA (OUTPATIENT)
Dept: SURGERY | Age: 32
End: 2022-01-21
Payer: COMMERCIAL

## 2022-01-21 ENCOUNTER — HOSPITAL ENCOUNTER (OUTPATIENT)
Age: 32
Setting detail: OUTPATIENT SURGERY
Discharge: HOME | End: 2022-01-21
Attending: SURGERY | Admitting: SURGERY
Payer: COMMERCIAL

## 2022-01-21 VITALS
HEIGHT: 71 IN | SYSTOLIC BLOOD PRESSURE: 127 MMHG | OXYGEN SATURATION: 98 % | HEART RATE: 87 BPM | BODY MASS INDEX: 31.78 KG/M2 | RESPIRATION RATE: 13 BRPM | WEIGHT: 227 LBS | DIASTOLIC BLOOD PRESSURE: 69 MMHG | TEMPERATURE: 98.6 F

## 2022-01-21 DIAGNOSIS — K40.90 INGUINAL HERNIA WITHOUT OBSTRUCTION OR GANGRENE, RECURRENCE NOT SPECIFIED, UNSPECIFIED LATERALITY: Primary | ICD-10-CM

## 2022-01-21 PROCEDURE — 77030013079 HC BLNKT BAIR HGGR 3M -A: Performed by: NURSE ANESTHETIST, CERTIFIED REGISTERED

## 2022-01-21 PROCEDURE — 77030002933 HC SUT MCRYL J&J -A: Performed by: SURGERY

## 2022-01-21 PROCEDURE — 74011250636 HC RX REV CODE- 250/636: Performed by: SURGERY

## 2022-01-21 PROCEDURE — 77030026438 HC STYL ET INTUB CARD -A: Performed by: NURSE ANESTHETIST, CERTIFIED REGISTERED

## 2022-01-21 PROCEDURE — 77030003666 HC NDL SPINAL BD -A: Performed by: SURGERY

## 2022-01-21 PROCEDURE — 77030019908 HC STETH ESOPH SIMS -A: Performed by: NURSE ANESTHETIST, CERTIFIED REGISTERED

## 2022-01-21 PROCEDURE — 77030037134 HC WRAP COMPR BACK THER SOLM -B

## 2022-01-21 PROCEDURE — 74011000258 HC RX REV CODE- 258: Performed by: NURSE ANESTHETIST, CERTIFIED REGISTERED

## 2022-01-21 PROCEDURE — C9290 INJ, BUPIVACAINE LIPOSOME: HCPCS | Performed by: SURGERY

## 2022-01-21 PROCEDURE — 77030036554: Performed by: SURGERY

## 2022-01-21 PROCEDURE — 77030040361 HC SLV COMPR DVT MDII -B

## 2022-01-21 PROCEDURE — 77030035236 HC SUT PDS STRATFX BARB J&J -B: Performed by: SURGERY

## 2022-01-21 PROCEDURE — 74011000250 HC RX REV CODE- 250: Performed by: SURGERY

## 2022-01-21 PROCEDURE — 74011250636 HC RX REV CODE- 250/636: Performed by: ANESTHESIOLOGY

## 2022-01-21 PROCEDURE — 74011000258 HC RX REV CODE- 258: Performed by: SURGERY

## 2022-01-21 PROCEDURE — 77030002966 HC SUT PDS J&J -A: Performed by: SURGERY

## 2022-01-21 PROCEDURE — C1781 MESH (IMPLANTABLE): HCPCS | Performed by: SURGERY

## 2022-01-21 PROCEDURE — 2709999900 HC NON-CHARGEABLE SUPPLY: Performed by: SURGERY

## 2022-01-21 PROCEDURE — 76060000035 HC ANESTHESIA 2 TO 2.5 HR: Performed by: SURGERY

## 2022-01-21 PROCEDURE — 77030033188 HC TBNG FLTRD BIIFUR DISP CNMD -C: Performed by: SURGERY

## 2022-01-21 PROCEDURE — 76210000020 HC REC RM PH II FIRST 0.5 HR: Performed by: SURGERY

## 2022-01-21 PROCEDURE — 77030040922 HC BLNKT HYPOTHRM STRY -A

## 2022-01-21 PROCEDURE — 77030020703 HC SEAL CANN DISP INTU -B: Performed by: SURGERY

## 2022-01-21 PROCEDURE — 77030012770 HC TRCR OPT FX AMR -B: Performed by: SURGERY

## 2022-01-21 PROCEDURE — 74011000250 HC RX REV CODE- 250: Performed by: NURSE ANESTHETIST, CERTIFIED REGISTERED

## 2022-01-21 PROCEDURE — 76010000876 HC OR TIME 2 TO 2.5HR INTENSV - TIER 2: Performed by: SURGERY

## 2022-01-21 PROCEDURE — 77030035277 HC OBTRTR BLDELSS DISP INTU -B: Performed by: SURGERY

## 2022-01-21 PROCEDURE — 77030022704 HC SUT VLOC COVD -B: Performed by: SURGERY

## 2022-01-21 PROCEDURE — 77030031139 HC SUT VCRL2 J&J -A: Performed by: SURGERY

## 2022-01-21 PROCEDURE — 77030008684 HC TU ET CUF COVD -B: Performed by: NURSE ANESTHETIST, CERTIFIED REGISTERED

## 2022-01-21 PROCEDURE — 74011250636 HC RX REV CODE- 250/636: Performed by: NURSE ANESTHETIST, CERTIFIED REGISTERED

## 2022-01-21 PROCEDURE — 76210000006 HC OR PH I REC 0.5 TO 1 HR: Performed by: SURGERY

## 2022-01-21 DEVICE — 3DMAX MESH, 10.8 CM X 16.0 CM (4.3" X 6.3"), RIGHT, LARGE
Type: IMPLANTABLE DEVICE | Site: INGUINAL | Status: FUNCTIONAL
Brand: 3DMAX

## 2022-01-21 DEVICE — 3DMAX MESH, 10.8 CM X 16.0 CM (4.3" X 6.3"), LEFT, LARGE
Type: IMPLANTABLE DEVICE | Site: INGUINAL | Status: FUNCTIONAL
Brand: 3DMAX

## 2022-01-21 DEVICE — VENTRALIGHT ST MESH, 4.5" (11.4 CM), CIRCLE
Type: IMPLANTABLE DEVICE | Site: UMBILICAL | Status: FUNCTIONAL
Brand: VENTRALIGHT

## 2022-01-21 RX ORDER — OXYCODONE HYDROCHLORIDE 5 MG/1
5 TABLET ORAL
Qty: 10 TABLET | Refills: 0 | Status: SHIPPED | OUTPATIENT
Start: 2022-01-21 | End: 2022-01-24

## 2022-01-21 RX ORDER — SUCCINYLCHOLINE CHLORIDE 20 MG/ML
INJECTION INTRAMUSCULAR; INTRAVENOUS AS NEEDED
Status: DISCONTINUED | OUTPATIENT
Start: 2022-01-21 | End: 2022-01-21 | Stop reason: HOSPADM

## 2022-01-21 RX ORDER — ROCURONIUM BROMIDE 10 MG/ML
INJECTION, SOLUTION INTRAVENOUS AS NEEDED
Status: DISCONTINUED | OUTPATIENT
Start: 2022-01-21 | End: 2022-01-21 | Stop reason: HOSPADM

## 2022-01-21 RX ORDER — SODIUM CHLORIDE, SODIUM LACTATE, POTASSIUM CHLORIDE, CALCIUM CHLORIDE 600; 310; 30; 20 MG/100ML; MG/100ML; MG/100ML; MG/100ML
100 INJECTION, SOLUTION INTRAVENOUS CONTINUOUS
Status: DISCONTINUED | OUTPATIENT
Start: 2022-01-21 | End: 2022-01-21 | Stop reason: HOSPADM

## 2022-01-21 RX ORDER — LIDOCAINE HYDROCHLORIDE 10 MG/ML
0.1 INJECTION, SOLUTION EPIDURAL; INFILTRATION; INTRACAUDAL; PERINEURAL AS NEEDED
Status: DISCONTINUED | OUTPATIENT
Start: 2022-01-21 | End: 2022-01-21 | Stop reason: HOSPADM

## 2022-01-21 RX ORDER — DEXAMETHASONE SODIUM PHOSPHATE 4 MG/ML
INJECTION, SOLUTION INTRA-ARTICULAR; INTRALESIONAL; INTRAMUSCULAR; INTRAVENOUS; SOFT TISSUE AS NEEDED
Status: DISCONTINUED | OUTPATIENT
Start: 2022-01-21 | End: 2022-01-21 | Stop reason: HOSPADM

## 2022-01-21 RX ORDER — DIPHENHYDRAMINE HYDROCHLORIDE 50 MG/ML
12.5 INJECTION, SOLUTION INTRAMUSCULAR; INTRAVENOUS AS NEEDED
Status: DISCONTINUED | OUTPATIENT
Start: 2022-01-21 | End: 2022-01-21 | Stop reason: HOSPADM

## 2022-01-21 RX ORDER — SODIUM CHLORIDE, SODIUM LACTATE, POTASSIUM CHLORIDE, CALCIUM CHLORIDE 600; 310; 30; 20 MG/100ML; MG/100ML; MG/100ML; MG/100ML
125 INJECTION, SOLUTION INTRAVENOUS CONTINUOUS
Status: DISCONTINUED | OUTPATIENT
Start: 2022-01-21 | End: 2022-01-21 | Stop reason: HOSPADM

## 2022-01-21 RX ORDER — KETOROLAC TROMETHAMINE 15 MG/ML
INJECTION, SOLUTION INTRAMUSCULAR; INTRAVENOUS AS NEEDED
Status: DISCONTINUED | OUTPATIENT
Start: 2022-01-21 | End: 2022-01-21 | Stop reason: HOSPADM

## 2022-01-21 RX ORDER — SODIUM CHLORIDE, SODIUM LACTATE, POTASSIUM CHLORIDE, CALCIUM CHLORIDE 600; 310; 30; 20 MG/100ML; MG/100ML; MG/100ML; MG/100ML
75 INJECTION, SOLUTION INTRAVENOUS CONTINUOUS
Status: DISCONTINUED | OUTPATIENT
Start: 2022-01-21 | End: 2022-01-21 | Stop reason: HOSPADM

## 2022-01-21 RX ORDER — FENTANYL CITRATE 50 UG/ML
INJECTION, SOLUTION INTRAMUSCULAR; INTRAVENOUS AS NEEDED
Status: DISCONTINUED | OUTPATIENT
Start: 2022-01-21 | End: 2022-01-21 | Stop reason: HOSPADM

## 2022-01-21 RX ORDER — SODIUM CHLORIDE 0.9 % (FLUSH) 0.9 %
5-40 SYRINGE (ML) INJECTION AS NEEDED
Status: DISCONTINUED | OUTPATIENT
Start: 2022-01-21 | End: 2022-01-21 | Stop reason: HOSPADM

## 2022-01-21 RX ORDER — LIDOCAINE HYDROCHLORIDE 20 MG/ML
INJECTION, SOLUTION EPIDURAL; INFILTRATION; INTRACAUDAL; PERINEURAL AS NEEDED
Status: DISCONTINUED | OUTPATIENT
Start: 2022-01-21 | End: 2022-01-21 | Stop reason: HOSPADM

## 2022-01-21 RX ORDER — ONDANSETRON 2 MG/ML
4 INJECTION INTRAMUSCULAR; INTRAVENOUS AS NEEDED
Status: DISCONTINUED | OUTPATIENT
Start: 2022-01-21 | End: 2022-01-21 | Stop reason: HOSPADM

## 2022-01-21 RX ORDER — ONDANSETRON 2 MG/ML
INJECTION INTRAMUSCULAR; INTRAVENOUS AS NEEDED
Status: DISCONTINUED | OUTPATIENT
Start: 2022-01-21 | End: 2022-01-21 | Stop reason: HOSPADM

## 2022-01-21 RX ORDER — HYDROMORPHONE HYDROCHLORIDE 1 MG/ML
.25-1 INJECTION, SOLUTION INTRAMUSCULAR; INTRAVENOUS; SUBCUTANEOUS
Status: DISCONTINUED | OUTPATIENT
Start: 2022-01-21 | End: 2022-01-21 | Stop reason: HOSPADM

## 2022-01-21 RX ORDER — SODIUM CHLORIDE 0.9 % (FLUSH) 0.9 %
5-40 SYRINGE (ML) INJECTION EVERY 8 HOURS
Status: DISCONTINUED | OUTPATIENT
Start: 2022-01-21 | End: 2022-01-21 | Stop reason: HOSPADM

## 2022-01-21 RX ORDER — MIDAZOLAM HYDROCHLORIDE 1 MG/ML
INJECTION, SOLUTION INTRAMUSCULAR; INTRAVENOUS AS NEEDED
Status: DISCONTINUED | OUTPATIENT
Start: 2022-01-21 | End: 2022-01-21 | Stop reason: HOSPADM

## 2022-01-21 RX ORDER — PROPOFOL 10 MG/ML
INJECTION, EMULSION INTRAVENOUS AS NEEDED
Status: DISCONTINUED | OUTPATIENT
Start: 2022-01-21 | End: 2022-01-21 | Stop reason: HOSPADM

## 2022-01-21 RX ADMIN — SODIUM CHLORIDE 2 MCG: 9 INJECTION, SOLUTION INTRAVENOUS at 13:18

## 2022-01-21 RX ADMIN — SODIUM CHLORIDE 4 MCG: 9 INJECTION, SOLUTION INTRAVENOUS at 11:48

## 2022-01-21 RX ADMIN — DEXAMETHASONE SODIUM PHOSPHATE 4 MG: 4 INJECTION, SOLUTION INTRAMUSCULAR; INTRAVENOUS at 11:50

## 2022-01-21 RX ADMIN — SODIUM CHLORIDE 4 MCG: 9 INJECTION, SOLUTION INTRAVENOUS at 12:09

## 2022-01-21 RX ADMIN — SODIUM CHLORIDE, POTASSIUM CHLORIDE, SODIUM LACTATE AND CALCIUM CHLORIDE 75 ML/HR: 600; 310; 30; 20 INJECTION, SOLUTION INTRAVENOUS at 11:00

## 2022-01-21 RX ADMIN — FENTANYL CITRATE 50 MCG: 50 INJECTION INTRAMUSCULAR; INTRAVENOUS at 11:49

## 2022-01-21 RX ADMIN — MIDAZOLAM HYDROCHLORIDE 1 MG: 1 INJECTION, SOLUTION INTRAMUSCULAR; INTRAVENOUS at 11:18

## 2022-01-21 RX ADMIN — SODIUM CHLORIDE 6 MCG: 9 INJECTION, SOLUTION INTRAVENOUS at 11:31

## 2022-01-21 RX ADMIN — SODIUM CHLORIDE 4 MCG: 9 INJECTION, SOLUTION INTRAVENOUS at 12:36

## 2022-01-21 RX ADMIN — LIDOCAINE HYDROCHLORIDE 20 MG: 20 INJECTION, SOLUTION EPIDURAL; INFILTRATION; INTRACAUDAL; PERINEURAL at 13:27

## 2022-01-21 RX ADMIN — LIDOCAINE HYDROCHLORIDE 80 MG: 20 INJECTION, SOLUTION EPIDURAL; INFILTRATION; INTRACAUDAL; PERINEURAL at 11:24

## 2022-01-21 RX ADMIN — PROPOFOL 250 MG: 10 INJECTION, EMULSION INTRAVENOUS at 11:25

## 2022-01-21 RX ADMIN — HYDROMORPHONE HYDROCHLORIDE 0.5 MG: 1 INJECTION, SOLUTION INTRAMUSCULAR; INTRAVENOUS; SUBCUTANEOUS at 14:08

## 2022-01-21 RX ADMIN — KETOROLAC TROMETHAMINE 30 MG: 15 INJECTION, SOLUTION INTRAMUSCULAR; INTRAVENOUS at 13:17

## 2022-01-21 RX ADMIN — ROCURONIUM BROMIDE 5 MG: 10 INJECTION INTRAVENOUS at 11:24

## 2022-01-21 RX ADMIN — FENTANYL CITRATE 25 MCG: 50 INJECTION INTRAMUSCULAR; INTRAVENOUS at 11:18

## 2022-01-21 RX ADMIN — HYDROMORPHONE HYDROCHLORIDE 0.5 MG: 1 INJECTION, SOLUTION INTRAMUSCULAR; INTRAVENOUS; SUBCUTANEOUS at 14:33

## 2022-01-21 RX ADMIN — ONDANSETRON HYDROCHLORIDE 4 MG: 2 SOLUTION INTRAMUSCULAR; INTRAVENOUS at 13:18

## 2022-01-21 RX ADMIN — PROPOFOL 30 MG: 10 INJECTION, EMULSION INTRAVENOUS at 11:26

## 2022-01-21 RX ADMIN — ROCURONIUM BROMIDE 20 MG: 10 INJECTION INTRAVENOUS at 11:51

## 2022-01-21 RX ADMIN — ROCURONIUM BROMIDE 25 MG: 10 INJECTION INTRAVENOUS at 11:30

## 2022-01-21 RX ADMIN — MIDAZOLAM HYDROCHLORIDE 1 MG: 1 INJECTION, SOLUTION INTRAMUSCULAR; INTRAVENOUS at 11:16

## 2022-01-21 RX ADMIN — SUGAMMADEX 400 MG: 100 INJECTION, SOLUTION INTRAVENOUS at 13:24

## 2022-01-21 RX ADMIN — CEFAZOLIN SODIUM 2 G: 1 POWDER, FOR SOLUTION INTRAMUSCULAR; INTRAVENOUS at 11:35

## 2022-01-21 RX ADMIN — SODIUM CHLORIDE, POTASSIUM CHLORIDE, SODIUM LACTATE AND CALCIUM CHLORIDE: 600; 310; 30; 20 INJECTION, SOLUTION INTRAVENOUS at 11:32

## 2022-01-21 RX ADMIN — FENTANYL CITRATE 50 MCG: 50 INJECTION INTRAMUSCULAR; INTRAVENOUS at 13:27

## 2022-01-21 RX ADMIN — SUCCINYLCHOLINE CHLORIDE 160 MG: 20 INJECTION, SOLUTION INTRAMUSCULAR; INTRAVENOUS at 11:26

## 2022-01-21 RX ADMIN — ROCURONIUM BROMIDE 20 MG: 10 INJECTION INTRAVENOUS at 12:29

## 2022-01-21 RX ADMIN — MIDAZOLAM HYDROCHLORIDE 3 MG: 1 INJECTION, SOLUTION INTRAMUSCULAR; INTRAVENOUS at 11:15

## 2022-01-21 RX ADMIN — FENTANYL CITRATE 75 MCG: 50 INJECTION INTRAMUSCULAR; INTRAVENOUS at 11:23

## 2022-01-21 RX ADMIN — ROCURONIUM BROMIDE 15 MG: 10 INJECTION INTRAVENOUS at 13:11

## 2022-01-21 NOTE — H&P
Assessment:     Symptomatic left, umbilical inguinal hernia    Plan:     Bilateral inguinal, umbilical hernia repair with mesh    Signed By: Darell Lisa MD  [address] [facility]  Office:  [phone]  Fax:  [phone]    January 21, 2022          General Surgery History    Subjective:      Robert Carranza is a 33 y.o.  male who presents with symptomatic inguinal hernia. See paper h/p for full details. Past Medical History:   Diagnosis Date    ADHD (attention deficit hyperactivity disorder) 1997    neuro-evaluation done. on concerta. 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    GERD (gastroesophageal reflux disease)     Psychiatric disorder     ADHD     Past Surgical History:   Procedure Laterality Date    HX ORTHOPAEDIC  2016    herniated disc     HX WISDOM TEETH EXTRACTION      HX WISDOM TEETH EXTRACTION  2014      Family History   Problem Relation Age of Onset    Tremors Mother     OSTEOARTHRITIS Mother     Cancer Mother     Cancer Father     Lung Disease Father     Elevated Lipids Maternal Grandmother     Diabetes Maternal Grandmother     OSTEOARTHRITIS Maternal Grandmother     Stroke Maternal Grandfather     OSTEOARTHRITIS Paternal Grandmother     Elevated Lipids Paternal Grandfather      Social History     Socioeconomic History    Marital status:    Tobacco Use    Smoking status: Never Smoker    Smokeless tobacco: Never Used   Vaping Use    Vaping Use: Never used   Substance and Sexual Activity    Alcohol use:  Yes     Alcohol/week: 1.0 - 2.0 standard drink     Types: 1 - 2 Cans of beer per week     Comment: A couple beers a week    Drug use: Never    Sexual activity: Yes     Partners: Female      Current Facility-Administered Medications   Medication Dose Route Frequency    lidocaine (PF) (XYLOCAINE) 10 mg/mL (1 %) injection 0.1 mL  0.1 mL SubCUTAneous PRN    lactated Ringers infusion  100 mL/hr IntraVENous CONTINUOUS    sodium chloride (NS) flush 5-40 mL  5-40 mL IntraVENous Q8H    sodium chloride (NS) flush 5-40 mL  5-40 mL IntraVENous PRN    lactated Ringers infusion  75 mL/hr IntraVENous CONTINUOUS    lidocaine (PF) (XYLOCAINE) 10 mg/mL (1 %) injection 0.1 mL  0.1 mL SubCUTAneous PRN    ceFAZolin (ANCEF) 2 g in sterile water (preservative free) 20 mL IV syringe  2 g IntraVENous ON CALL TO OR      No Known Allergies    Review of Systems:     []     Unable to obtain  ROS due to  []    mental status change  []    sedated   []    intubated   [x]    Total of 12 system negative, unless specified below or in HPI:  Constitutional: negative fever, negative chills, negative weight loss  Eyes:   negative visual changes  ENT:   negative sore throat, tongue or lip swelling  Respiratory:  negative cough, negative dyspnea  Cards:  negative for chest pain, palpitations, lower extremity edema  GI:   negative for nausea, vomiting, diarrhea, and abdominal pain  :  negative for frequency, dysuria  Integument:  negative for rash and pruritus  Heme:  negative for easy bruising and gum/nose bleeding  Musculoskel: negative for myalgias,  back pain and muscle weakness  Neuro:  negative for headaches, dizziness, vertigo  Psych:  negative for feelings of anxiety, depression     Objective:        Patient Vitals for the past 8 hrs:   BP Temp Pulse Resp SpO2 Height Weight   22 1000 126/86 98.3 °F (36.8 °C) 73 18 99 % 5' 11\" (1.803 m) 103 kg (227 lb)       Temp (24hrs), Av.3 °F (36.8 °C), Min:98.3 °F (36.8 °C), Max:98.3 °F (36.8 °C)      Physical Exam:  General:  Alert, cooperative, no distress, appears stated age. Eyes:  Conjunctivae/corneas clear. PERRL, EOMs intact. Nose: Nares normal. Septum midline. Mucosa normal. No drainage or sinus tenderness.    Mouth/Throat: Lips, mucosa, and tongue normal. Teeth and gums normal.   Neck: Supple, symmetrical, trachea midline, no adenopathy, thyroid: no enlargment/tenderness/nodules, no carotid bruit and no JVD. Back:   Symmetric, no curvature. ROM normal. No CVA tenderness. Lungs:   Clear to auscultation bilaterally. Heart:  Regular rate and rhythm, S1, S2 normal, no murmur, click, rub or gallop. Abdomen:   Soft, non-tender. Reducible groin bulge. Bowel sounds normal. No masses,  No organomegaly. Extremities: Extremities normal, atraumatic, no cyanosis or edema. Pulses: 2+ and symmetric all extremities. Skin: Skin color, texture, turgor normal. No rashes or lesions   Lymph nodes: Cervical, supraclavicular, and axillary nodes normal.     Labs: No results for input(s): WBC, HGB, HCT, PLT, HGBEXT, HCTEXT, PLTEXT in the last 72 hours. No results for input(s): NA, K, CL, CO2, GLU, BUN, CREA, CA, MG, PHOS, ALB, TBIL, TBILI, ALT in the last 72 hours. No lab exists for component: SGOT  No results for input(s): INR, INREXT in the last 72 hours.

## 2022-01-21 NOTE — ANESTHESIA PREPROCEDURE EVALUATION
Relevant Problems   No relevant active problems       Anesthetic History   No history of anesthetic complications            Review of Systems / Medical History  Patient summary reviewed and pertinent labs reviewed    Pulmonary  Within defined limits                 Neuro/Psych         Psychiatric history  Pertinent negatives: No seizures, TIA and CVA   Cardiovascular                Pertinent negatives: No past MI, angina and CHF  Exercise tolerance: >4 METS     GI/Hepatic/Renal  Within defined limits           Pertinent negatives: No renal disease   Endo/Other        Arthritis  Pertinent negatives: No diabetes   Other Findings              Physical Exam    Airway  Mallampati: III  TM Distance: 4 - 6 cm  Neck ROM: normal range of motion, short neck   Mouth opening: Normal     Cardiovascular      Rate: normal         Dental  No notable dental hx       Pulmonary  Breath sounds clear to auscultation               Abdominal         Other Findings            Anesthetic Plan    ASA: 2  Anesthesia type: general          Induction: Intravenous  Anesthetic plan and risks discussed with: Patient      Patient is very anxious this am. Nauseated with IV placement. Last took methylphenidate yesterday am. Plan JERMAINE DUNN.

## 2022-01-21 NOTE — DISCHARGE INSTRUCTIONS
POST-OPERATIVE INSTRUCTIONS  OUTPATIENT SURGERY LAPAROSCOPIC/ROBOT ASSISTED HERNIA REPAIR    Today you underwent hernia repair which is usually well tolerated. However, below is a list of instructions which are important for you to follow. If you have any questions, please feel free to call the main office. 1. EATING: Please eat lightly when you go home today for the first 24 hours. You may resume your regular diet after that. 2. EXERCISE: Limit your exercise for the first week. Stairs or other walking is fine, increasing the amount of aerobic activity after the first week. No heavy lifting for four weeks. 3. DRESSING: You may shower in 24 hours; the clear dressing can get wet. 48 hours after your surgery, the top clear bandage may be removed and left undressed or covered with a lighter dressing. Do not bathe in a tub or pool for at least 4 weeks. 4. NO LIFTING: Until you have seen your surgeon in his/her office following surgery, at which time you will receive further instructions. 5. DRIVING: No driving for at least 48 hours postoperatively. When you are able to tolerate post-surgical pain WITHOUT the use of narcotics and manage the car without increased pain or restriction you may drive. If your vehicle requires you to pull or hoist yourself into the vehicle, driving that vehicle is not advised. You may ride as a passenger anytime. Otherwise, wait until the follow up visit. 6. PAIN: I try to make the post-operative course tolerable, but you will-and should-have some discomfort for a few days (even with pain medication.)      a)  Walking is fine, but too much activity after surgery can aggravate pain. On the other hand, you don't need to be in bed all day either. You should ambulate every few hours while awake.   Focus on basic activities like ambulating to bathroom, to meals, and very short trips outside (even to mailbox may be excessive) and go back to comfortable resting position with ice packs. b)  Very important:  Use ice packs as often as possible (fifteen minutes on, fifteen minutes off and exchange as needed). c) If pain is not managed with the above measures, a narcotic will be provided for break-through pain which should be used very carefully. In some cases, narcotic medication may cause constipation - Colace, senna or Milk of Magnesia may be used as needed. You should not go more than 24 hours outside of your usual time of passing stool. 7. WOUND: If you notice any increased redness, swelling, pain or fever, please call the office. If this is noticed at a time after normal office hours, please call our answering service at [phone]. The  will then contact your surgeon or the Surgical Associate Alejandrina Staples. 8. URINATION: If unable to void (unable to pass your water) for more than 10 hours after your surgery please return to our hospital emergency department. 9. APPOINTMENT: I would like to see you in the office in in 14-21 days. A Telehealth appointment is fine for follow up, as long as there are no issues that you feel warrant an office visit. If this appointment has not been made for you prior to your departure from Outpatient Surgery, please call the office to schedule your appointment. 10. DISCOLORATION: Do not be alarmed by black or bluish discoloration of your anatomy. This may extent to the scrotum or labia. This will be due to blood under the skin that will absorb itself and resolve over several days to weeks with no ill-effects. If you have any questions or concerns, please do not hesitate to call or ask any other staff member who will be able to assist you.     Divya Ruff MD  [address] [place] [phone]    DISCHARGE SUMMARY from your Nurse      PATIENT INSTRUCTIONS    After general anesthesia or intravenous sedation, for 24 hours or while taking prescription Narcotics:  · Limit your activities  · Do not drive and operate hazardous machinery  · Do not make important personal or business decisions  · Do  not drink alcoholic beverages  · If you have not urinated within 8 hours after discharge, please contact your surgeon on call. Report the following to your surgeon:  · Excessive pain, swelling, redness or odor of or around the surgical area  · Temperature over 100.5  · Nausea and vomiting lasting longer than 4 hours or if unable to take medications  · Any signs of decreased circulation or nerve impairment to extremity: change in color, persistent  numbness, tingling, coldness or increase pain  · Any questions      GOOD HELP TO FIGHT AN INFECTION  Here are a few tip to help reduce the chance of getting an infection after surgery:   Wash Your Hands   Good handwashing is the most important thing you and your caregiver can do.  Wash before and after caring for any wounds. Dry your hand with a clean towel.  Wash with soap and water for at least 20 seconds. A TIP: sing the \"Happy Birthday\" song through one time while washing to help with the timing.  Use a hand  in between washings.  Shower   When your surgeon says it is OK to take a shower, use a new bar of antibacterial soap (if that is what you use, and keep that bar of soap ONLY for your use), or antibacterial body wash.  Use a clean wash cloth or sponge when you bathe.  Dry off with a clean towel  after every bath - be careful around any wounds, skin staples, sutures or surgical glue over/on wounds.  Do not enter swimming pools, hot tubs, lakes, rivers and/or ocean until wounds are healed and your doctor/surgeon says it is OK.  Use Clean Sheets   Sleep on freshly laundered sheets after your surgery.  Keep the surgery site covered with a clean, dry bandage (if instructed to do so). If the bandage becomes soiled, reapply a new, dry, clean bandage.  Do not allow pets to sleep with you while your wound is healing.      Lifestyle Modification and Controlling Your Blood Sugar   Smoking slows wound healing. Stop smoking and limit exposure to second-hand smoke.  High blood sugar slows wound healing. Eat a well-balanced diet to provide proper nutrition while healing   Monitor your blood sugar (if you are a diabetic) and take your medications as you are suppose to so you can control you blood sugar after surgery. COUGH AND DEEP BREATHE    Breathing deeply and coughing are very important exercises to do after surgery. Deep breathing and coughing open the little air tubes and air sacks in your lungs. You take deep breaths every day. You may not even notice - it is just something you do when you sigh or yawn. It is a natural exercise you do to keep these air passages open. After surgery, take deep breaths and cough, on purpose. DIRECTIONS:  · Take 10 to 15 slow deep breaths every hour while awake. · Breathe in deeply, and hold it for 2 seconds. · Exhale slowly through puckered lips, like blowing up a balloon. · After every 4th or 5th deep breath, hug your pillow to your chest or belly and give a hard, deep cough. Yes, it will probably hurt. But doing this exercise is a very important part of healing after surgery. Take your pain medicine to help you do this exercise without too much pain. Coughing and deep breathing help prevent bronchitis and pneumonia after surgery. If you had chest or belly surgery, use a pillow as a \"hug maribell\" and hold it tightly to your chest or belly when you cough. ANKLE PUMPS    Ankle pumps increase the circulation of oxygenated blood to your lower extremities and decrease your risk for circulation problems such as blood clots. They also stretch the muscles, tendons and ligaments in your foot and ankle, and prevent joint contracture in the ankle and foot, especially after surgeries on the legs.     It is important to do ankle pump exercises regularly after surgery because immobility increases your risk for developing a blood clot. Your doctor may also have you take an Aspirin for the next few days as well. If your doctor did not ask you to take an Aspirin, consult with him before starting Aspirin therapy on your own. The exercise is quite simple. · Slowly point your foot forward, feeling the muscles on the top of your lower leg stretch, and hold this position for 5 seconds. · Next, pull your foot back toward you as far as possible, stretching the calf muscles, and hold that position for 5 seconds. · Repeat with the other foot. · Perform 10 repetitions every hour while awake for both ankles if possible (down and then up with the foot once is one repetition). You should feel gentle stretching of the muscles in your lower leg when doing this exercise. If you feel pain, or your range of motion is limited, don't push too hard. Only go the limit your joint and muscles will let you go. If you have increasing pain, progressively worsening leg warmth or swelling, STOP the exercise and call your doctor. MEDICATION AND   SIDE EFFECT GUIDE    The [facility] MEDICATION AND SIDE EFFECT GUIDE was provided to the PATIENT AND CARE PROVIDER. Information provided includes instruction about drug purpose and common side effects for the following medications:   · ***        These are general instructions for a healthy lifestyle:    *   Please give a list of your current medications to your Primary Care Provider. *   Please update this list whenever your medications are discontinued, doses are changed, or new medications (including over-the-counter products) are added. *   Please carry medication information at all times in case of emergency situations. About Smoking  No smoking / No tobacco products  Avoid exposure to second hand smoke     Surgeon General's Warning:  Quitting smoking now greatly reduces serious risk to your health.     Obesity, smoking, and sedentary lifestyle greatly increases your risk for illness and disease. A healthy diet, regular physical exercise & weight monitoring are important for maintaining a healthy lifestyle. Congestive Heart Failure  You may be retaining fluid if you have a history of heart failure or if you experience any of the following symptoms:  Weight gain of 3 pounds or more overnight or 5 pounds in a week, increased swelling in your hands or feet or shortness of breath while lying flat in bed. Please call your doctor as soon as you notice any of these symptoms; do not wait until your next office visit. Recognize signs and symptoms of STROKE:  F -  Face looks uneven  A -  Arms unable to move or move evenly  S -  Speech slurred or non-existent  T -  Time-call 911 as soon as signs and symptoms begin-DO NOT go          back to bed or wait to see if you get better-TIME IS BRAIN. Warning Signs of HEART ATTACK   Call 911 if you have these symptoms:     Chest discomfort. Most heart attacks involve discomfort in the center of the chest that lasts more than a few minutes, or that goes away and comes back. It can feel like uncomfortable pressure, squeezing, fullness, or pain.  Discomfort in other areas of the upper body. Symptoms can include pain or discomfort in one or both arms, the back, neck, jaw, or stomach.  Shortness of breath with or without chest discomfort.  Other signs may include breaking out in a cold sweat, nausea, or lightheadedness. Don't wait more than five minutes to call 911 - MINUTES MATTER! Fast action can save your life. Calling 911 is almost always the fastest way to get lifesaving treatment. Emergency Medical Services staff can begin treatment when they arrive -- up to an hour sooner than if someone gets to the hospital by car. Learning About Coronavirus [phone])  Coronavirus [phone]): Overview  What is coronavirus (COVID-19)?   The coronavirus disease (COVID-19) is caused by a virus. It is an illness that was first found in [place], [place], in December 2019. It has since spread worldwide. The virus can cause fever, cough, and trouble breathing. In severe cases, it can cause pneumonia and make it hard to breathe without help. It can cause death. Coronaviruses are a large group of viruses. They cause the common cold. They also cause more serious illnesses like Middle East respiratory syndrome (MERS) and severe acute respiratory syndrome (SARS). COVID-19 is caused by a novel coronavirus. That means it's a new type that has not been seen in people before. This virus spreads person-to-person through droplets from coughing and sneezing. It can also spread when you are close to someone who is infected. And it can spread when you touch something that has the virus on it, such as a doorknob or a tabletop. What can you do to protect yourself from coronavirus (COVID-19)? The best way to protect yourself from getting sick is to:  · Avoid areas where there is an outbreak. · Avoid contact with people who may be infected. · Wash your hands often with soap or alcohol-based hand sanitizers. · Avoid crowds and try to stay at least 6 feet away from other people. · Wash your hands often, especially after you cough or sneeze. Use soap and water, and scrub for at least 20 seconds. If soap and water aren't available, use an alcohol-based hand . · Avoid touching your mouth, nose, and eyes. What can you do to avoid spreading the virus to others? To help avoid spreading the virus to others:  · Cover your mouth with a tissue when you cough or sneeze. Then throw the tissue in the trash. · Use a disinfectant to clean things that you touch often. · Stay home if you are sick or have been exposed to the virus. Don't go to school, work, or public areas. And don't use public transportation. · If you are sick:  ? Leave your home only if you need to get medical care.  But call the doctor's office first so they know you're coming. And wear a face mask, if you have one.  ? If you have a face mask, wear it whenever you're around other people. It can help stop the spread of the virus when you cough or sneeze. ? Clean and disinfect your home every day. Use household  and disinfectant wipes or sprays. Take special care to clean things that you grab with your hands. These include doorknobs, remote controls, phones, and handles on your refrigerator and microwave. And don't forget countertops, tabletops, bathrooms, and computer keyboards. When to call for help  Call 911 anytime you think you may need emergency care. For example, call if:  · You have severe trouble breathing. (You can't talk at all.)  · You have constant chest pain or pressure. · You are severely dizzy or lightheaded. · You are confused or can't think clearly. · Your face and lips have a blue color. · You pass out (lose consciousness) or are very hard to wake up. Call your doctor now if you develop symptoms such as:  · Shortness of breath. · Fever. · Cough. If you need to get care, call ahead to the doctor's office for instructions before you go. Make sure you wear a face mask, if you have one, to prevent exposing other people to the virus. Where can you get the latest information? The following health organizations are tracking and studying this virus. Their websites contain the most up-to-date information. [place] also learn what to do if you think you may have been exposed to the virus. · U.S. Centers for Disease Control and Prevention (CDC): The CDC provides updated news about the disease and travel advice. The website also tells you how to prevent the spread of infection. www.cdc.gov  · World Health Organization [facility]): WHO offers information about the virus outbreaks. WHO also has travel advice. www.who.int  Current as of: April 1, 2020               Content Version: 12.4  © 5846-5976 [employer].    Care instructions adapted under license by your healthcare professional. If you have questions about a medical condition or this instruction, always ask your healthcare professional. [place] [address] any warranty or liability for your use of this information. The discharge information has been reviewed with the {PATIENT PARENT GUARDIAN:73859}. Any questions and concerns from the {PATIENT PARENT GUARDIAN:62127} have been addressed. The {PATIENT PARENT GUARDIAN:35738} verbalized understanding. CONTENTS FOUND IN YOUR DISCHARGE ENVELOPE:  [x]     Surgeon and Hospital Discharge Instructions  [x]     [facility] Surgical Services Care Provider Card  []     Medication & Side Effect Guide            (your newly prescribed medications have been marked/highlighted showing the most common side effects from   the classes of drugs on your prescriptions)  []     Medication Prescription(s) x *** ( [] These have been sent electronically to your pharmacy by your surgeon,   - OR -       your surgeon has already provided these to you during a previous/pre-op office visit)  []     [address]  []     Physical Therapy Prescription  []     Follow-up Appointment Cards  []     Surgery-related Pictures/Media  []     Pain block and/or block with On-Q Catheter from Anesthesia Service (information included in your instructions above)  []     Medical device information sheets/pamphlets from their    []     School/work excuse note. []     /parent work excuse note. The following personal items collected during your admission are returned to you:   Dental Appliance: Dental Appliances: None  Vision: Visual Aid: Glasses,With patient  Hearing Aid:    Jewelry: Jewelry: None  Clothing: Clothing:  (Street clothing to locker)  Other Valuables:  Other Valuables: Eyeglasses  Valuables sent to safe:         Cold Therapy Instructions    Your nurse will provide a cold therapy wrap based upon your surgery, need, and your doctor's orders. INSTRUCTIONS FOR USE:  All cooling wraps produce sustained periods of intense cold. NEVER PLACE PAD ON BARE SKIN OR HAVE CONTACT WITH BARE SKIN  Always Use An Insulating Barrier. Tissue injury can occur if these devices are used improperly. Follow your surgeons instructions carefully regarding the frequency, duration and breaks from cold therapy, and the total length of treatment. Check under the pad barrier every 2 hours for skin injury (see below.)      SIGNS OF SKIN INJURY:  STOP USE AND CONTACT YOUR SURGEON if any of the following occur: Increased pain, burning, increased swelling, itching, blisters, increased redness, discoloration, welts, or other change in skin condition. INDICATIONS:  Back, Hip, Knee or Shoulder Surgery and Post-Operative Treatment; Trauma; Orthopedic Rehabilitation      CONTRAINDICATIONS:  Cold therapy should not be used by persons with Diabetes, Raynaud's or other vasospastic disease, cold hypersensititvity, or compromised local circulation. Certain medical conditions make cold-induced injury more likely. Please consult with your healthcare provider before use. Controlling Post-Operative Pain with EXPAREL®   (Bupivacaine Liposome Injectable Suspension)    Pain control after surgery is important for your recovery. Your surgeon may use different medications to help control your pain, and other techniques such as rest, ice, and elevation may also be helpful. YOUR SURGEON GAVE YOU EXPAREL     EXPAREL is a local analgesic (a kind of numbing medicine) to help control pain after surgery. This medicine was injected in/around your incision before the surgery was over. Sometimes, the anesthesia team will use EXPAREL (if you received a regional anesthesia technique) to help control pain after surgery. Some things you may want to know:     EXPAREL contains the medication bupivacaine.   it numbs the tissues and nerves around the surgery site.  Dejon Reynolds is specially designed to release the numbing medication slowly over time. In making the EXPAREL release slowly over time, it may help reduce the number of other medications like narcotics you may need.  In addition to [address], your doctor may provide you other pain medications to control you pain.  Each patient is different and may respond to pain and pain medications differently. Depending on how you respond to EXPAREL, you may require less additional pain medication during your recovery. YOUR RECOVERY    When your pain is under control, your body can focus better on healing. This is not the time to test your pain tolerance, to tough it out or grin and bear it.   If you feel your pain is not well controlled speak with your surgeon to discuss your pain.  Follow your discharge instructions.  Eat a healthy diet and drink plenty of water. Surgery stresses your body; your body responds by needing more energy to heal.    IMPORTANT PATIENT INFORMATION    Products that contain bupivacaine, like EXPAREL, may cause a temporary loss of sensation or the ability to move the area where the medicine was injected - this is normal and often the desired outcome of the injection, to help with pain control. However, the numbness should not last forever. Follow your post op instructions carefully. Tell your doctor if you are pregnant, think you are pregnant, or are considering breastfeeding; certain obstetrical and gynecological procedures should not use EXPAREL. Juan Ramon Ax has not been studied in patients younger than 18 years. Side effects can occur with any medication, and it is important not to ignore anything you may be experiencing.   Some patients who received EXPAREL experienced:            Nausea              Vomiting             Constipation    Rarely, patients who receive bupivacaine (the active ingredient in EXPAREL) have experienced numbness and tingling in their mouth or lips, lightheadedness, anxiety, or abnormal heart rate. Speak with your doctor right away if you think you may be experiencing any of these sensations, or if you have other questions regarding the possible side effects. Tell your doctor if you have severe liver or kidney disease. Visit "[employer] or call 9-064-DS-EXPAREL for more information.     DATE GIVEN ***     TIME GIVEN ***     DOSE GIVEN ***mg  Other formulations of bupivacaine should not be administered within 96 hours following administration of EXPAREL     Modified from patient education handout, [address] [place] [address], [facility] 327 form [id number]; 17 March 2021

## 2022-01-21 NOTE — ANESTHESIA POSTPROCEDURE EVALUATION
Procedure(s):  ROBOTIC ASSISTED UMBILICAL AND BILATERAL INGUINAL HERNIA REPAIR WITH MESH. general    Anesthesia Post Evaluation        Patient location during evaluation: PACU  Patient participation: complete - patient participated  Level of consciousness: sleepy but conscious  Pain management: adequate  Airway patency: patent  Anesthetic complications: no  Cardiovascular status: acceptable and stable  Respiratory status: acceptable and unassisted  Hydration status: acceptable  Comments: The patient was seen and evaluated in the post-operative period. The time of my evaluation may not match the time of this note. The patient denied uncontrolled pain or nausea, and there were no significant complications evident. Allan Iqbal MD      Post anesthesia nausea and vomiting:  none  Final Post Anesthesia Temperature Assessment:  Normothermia (36.0-37.5 degrees C)      INITIAL Post-op Vital signs:   Vitals Value Taken Time   /89 01/21/22 1350   Temp 37 °C (98.6 °F) 01/21/22 1344   Pulse 88 01/21/22 1356   Resp 14 01/21/22 1356   SpO2 97 % 01/21/22 1356   Vitals shown include unvalidated device data.

## 2022-02-02 NOTE — BRIEF OP NOTE
Brief Postoperative Note    Patient: Dalia Lema  YOB: 1990  MRN: [id number]    Date of Procedure: 1/21/2022     Pre-Op Diagnosis: UMBLICAL HERNIA    Post-Op Diagnosis: Same as preoperative diagnosis. Procedure(s):  ROBOTIC ASSISTED UMBILICAL AND BILATERAL INGUINAL HERNIA REPAIR WITH MESH    Surgeon(s):  Seema Heredia MD    Surgical Assistant: Surg Asst-1: Gabino Petersen    Anesthesia: General     Estimated Blood Loss (mL): Minimal    Complications: None    Specimens: * No specimens in log *     Implants:   Implant Name Type Inv. Item Serial No.  Lot No. LRB No. Used Action   MESH SANDY L [id number] L INGUINAL WHT POLYPR MFIL - SNA  MESH SANDY L [id number] L INGUINAL WHT POLYPR MFIL NA BARD DAVOL_WD [id number] Left 1 Implanted   MESH SANDY LG 4X6IN R LF -- 3DMAX - SNA  MESH SANDY L [id number] R INGUINAL WHT POLYPR MFIL NA BARD DAVOL_WD [id number] Right 1 Implanted   MESH SANDY [place] 4. 5IN -- VENTRALIGHT S/T - SNA  MESH SANDY [place] 4. 5IN -- VENTRALIGHT S/T NA BARD DAVOL_WD [id number] N/A 1 Implanted       Drains: * No LDAs found *    Findings: Indirect inguinal defects, 3 cm umbilical defect    Electronically Signed by Daniel Hidalgo MD on 2/2/2022 at 10:25 AM

## 2022-02-02 NOTE — OP NOTES
Operative Note    Patient: Jerry Boyd  YOB: 1990  MRN: [id number]    Date of Procedure: 1/21/2022     Pre-Op Diagnosis: UMBLICAL HERNIA    Post-Op Diagnosis: Same as preoperative diagnosis. Procedure(s):  ROBOTIC ASSISTED UMBILICAL AND BILATERAL INGUINAL HERNIA REPAIR WITH MESH    Surgeon(s):  Haleigh Valle MD    Surgical Assistant: Surg Asst-1: Kit Needle    Anesthesia: General     Estimated Blood Loss (mL): Minimal    Complications: None    Specimens: * No specimens in log *     Implants:   Implant Name Type Inv. Item Serial No.  Lot No. LRB No. Used Action   MESH SANDY L [id number] L INGUINAL WHT POLYPR MFIL - SNA  MESH SANDY L [id number] L INGUINAL WHT POLYPR MFIL NA BARD DAVOL_WD [id number] Left 1 Implanted   MESH SANDY LG 4X6IN R LF -- 3DMAX - SNA  MESH SANDY L [id number] R INGUINAL WHT POLYPR MFIL NA BARD DAVOL_WD [id number] Right 1 Implanted   MESH SANDY [place] 4. 5IN -- VENTRALIGHT S/T - SNA  MESH SANDY [place] 4. 5IN -- VENTRALIGHT S/T NA BARD DAVOL_WD [id number] N/A 1 Implanted       Drains: * No LDAs found *    Findings: Indirect inguinal defects, 3 cm umbilical defect    Electronically Signed by Andre Hennessy MD on 2/2/2022 at 10:25 AM    Indication:  See H/P. Procedure:  Site of surgery and procedure was verified and marked in pre-operative holding and again in the operating room. Preoperative antibiotics were given 30 minutes prior to skin incision. Sequential compression devices were placed and turned on. Patient placed supine and general anesthesia was instituted sucessfully. Both arms were tucked and the abdomen was prepped and draped in usual fashion. Boston catheter was placed. Exparel expanded with 0.5% plain marcaine was injected subcutaneously along the projected port sites. Supra-umbilical incision was made, 5mm port was passed across the umbilical hernia under direct vision. Insufflation was establised and maintained.   8mm ports were placed in usual position and the 5mm port was exchanged for 8mm port. Robot was brought in and docked. The right sided defect was approached first.  Darío's ligament, the inferior epigastric vessels, the spermatic cord,  the position of the iliac vessels and the iliopubic tract were clearly identified  The peritoneum was divided along a line 2cm above the superior edge of the hernia defect to the anterior superior iliac spine. The peritoneal flap was mobilized inferiorly using blunt dissection. The pubic symphysis was identified. Pre-peritoneal fat was dissected carefully from spermatic cord and vas. The peritoneum from Darío's ligament was dissected to its junction with the femoral vein. The dissection was continued to the iliopubic tract, with care to avoid injury to the femoral branch of the genitofemoral nerve and the lateral femoral cutaneous nerve by  peritoneum from transversalis fascia. No additional hernias were seen. Attention was then turned to the left side. Darío's ligament, the inferior epigastric vessels, the spermatic cord,  the position of the iliac vessels and the iliopubic tract were clearly identified  The peritoneum divide was continued from midline to the left side, 2 cm above anterior superior iliac spine. The peritoneal flap was mobilized inferiorly using blunt dissection. Pubic symphysis was identified. Pre-peritoneal fat was dissected carefully from spermatic cord and vas. The peritoneum from Darío's ligament was dissected to it's junction with the femoral vein. The dissection was continued to the iliopubic tract, with care to avoid injury to the femoral branch of the genitofemoral nerve and the lateral femoral cutaneous nerve by continuing the separation of peritoneum from transversalis fascia. No additional hernias were seen. At this juncture, a large left synthetic mesh was prepared and introduced into the operative field.   It was deployed over the myopectineal orifice to cover the direct, indirect and femoral spaces. Special care was taken to cover Darío's ligament medially and appropriate lateral and anterior mesh. The mesh was secured with 0 ethibond suture to the shelving edge and anterior abdominal wall above iliopubic tract. The left sided peritoneal flap was re-approximated with running 2-0 v-loc suture. A large right synthetic mesh was prepared and introduced into the operative field. It was deployed over the myopectineal orifice to cover the direct, indirect and femoral spaces. Special care was taken to cover Darío's ligament medially and appropriate lateral and anterior mesh. The mesh was secured with 0 ethibond suture to the shelving edge and anterior abdominal wall above iliopubic tract. The right sided peritoneal flap was re-approximated with running 2-0 v-loc suture. Attention was turned to the midline:  Pre-peritoneal flaps were raised caudally and cephalad for approximately 12 cm along the patient's midline to expose the posterior sheath. The umbilical incisional defect was visualized and was measured at approximately 3 cm. Images were archived for the electronic chart. The hernias were re-approximated with 1 PDS unidirectional suture. A 11.4 cm partially absorbable mesh was introduced through the 8 mm assistant port, oriented appropriately to the midline of the abdomen and secured into place by running several 2-0 PDS sutures about the peripery of the centered mesh. The trocars were removed under direct vision and the pneumoperitoneum was evacuated. Sponge, instruments and needle counts were correct. Skin was closed with 4-0 Monocryl, skin apposed with steristrips and tegaderm. The patient awoke from anesthesia in satisfactory condition.       Zenia Dobbins MD

## 2022-03-18 PROBLEM — L91.8 INFLAMED SKIN TAG: Status: ACTIVE | Noted: 2019-01-21

## 2022-11-01 ENCOUNTER — VIRTUAL VISIT (OUTPATIENT)
Dept: FAMILY MEDICINE CLINIC | Age: 32
End: 2022-11-01

## 2022-11-01 DIAGNOSIS — F98.8 ATTENTION DEFICIT DISORDER (ADD) WITHOUT HYPERACTIVITY: Primary | ICD-10-CM

## 2022-11-01 PROCEDURE — 99213 OFFICE O/P EST LOW 20 MIN: CPT | Performed by: FAMILY MEDICINE

## 2022-11-01 RX ORDER — METHYLPHENIDATE HYDROCHLORIDE 36 MG/1
72 TABLET ORAL DAILY
Qty: 60 TABLET | Refills: 0 | Status: SHIPPED | OUTPATIENT
Start: 2022-12-01 | End: 2022-12-31

## 2022-11-01 RX ORDER — METHYLPHENIDATE HYDROCHLORIDE 36 MG/1
72 TABLET ORAL DAILY
Qty: 60 TABLET | Refills: 0 | Status: SHIPPED | OUTPATIENT
Start: 2022-12-31 | End: 2023-01-30

## 2022-11-01 RX ORDER — METHYLPHENIDATE HYDROCHLORIDE 36 MG/1
72 TABLET ORAL DAILY
Qty: 60 TABLET | Refills: 0 | Status: SHIPPED | OUTPATIENT
Start: 2022-11-01 | End: 2022-12-01

## 2022-11-01 NOTE — PROGRESS NOTES
Etelvina Espinoza  27 y.o. male  1990  [address] [facility] [address] [address]  [id number]   [address]:    Telemedicine Progress Note  Erika Martinez MD       Encounter Date and Time: November 1, 2022 at 3:04 PM    Etelvina Espinoza, was evaluated through a synchronous (real-time) audio-video encounter. The patient (or guardian if applicable) is aware that this is a billable service, which includes applicable co-pays. This Virtual Visit was conducted with patient's (and/or legal guardian's) consent. The visit was conducted pursuant to the emergency declaration under the [address] [place] [address], [address] [place] [address] and the [employer] Response and [employer] General Act. Patient identification was verified, and a caregiver was present when appropriate. The patient was located at: Home: [address] [facility] [address] [address]  The provider was located at: Facility (Appt Department): [address] [place] [address]    Chief Complaint   Patient presents with    Attention Deficit Disorder     History of Present Illness   Etelvina Espinoza is a 27 y.o. male was evaluated by synchronous (real-time) audio-video technology from home, through a secure patient portal.  He presents to clinic today for ADHD Follow-up. ADHD Medication compliance: weekends and school holidays off    Patient's perception of whether/to what extent meds are effective: Medication is effective  New Stressors? Left [employer] at the end of last year, now working at [address] [place] [address] w/ [employer]. Has been a bit of a process. Is doing band and orchestra. Got COVID a couple weeks ago. If there is counseling or mental health involvement, is pt attending sessions? Effective?  N/a      Growth  Wt Readings from Last 3 Encounters:   01/21/22 227 lb (103 kg)   01/11/22 227 lb 15.3 oz (103.4 kg)   12/20/21 224 lb (101.6 kg)     There is no height or weight on file to calculate BMI. No height and weight on file for this encounter. Facility age limit for growth percentiles is 20 years. Facility age limit for growth percentiles is 20 years. ROS:  Appetite: Normal  Sleep: Normal  Headache: None  Stomachache: None  Tics: None  Picking: None  More Emotional: No  Dull/listless: No  Irritable: No   Socially withdrawn: No  Other: none     Patients past medical history and social history reviewed:  Past Medical History:   Diagnosis Date    ADHD (attention deficit hyperactivity disorder) 1997    neuro-evaluation done. on concerta. 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 Current Outpatient Medications   Medication Sig Dispense Refill    fluticasone propionate (FLONASE) 50 mcg/actuation nasal spray 2 Sprays by Both Nostrils route as needed. bismuth subsalicylate (PEPTO-BISMOL) 262 mg chew Take 2 Tablets by mouth as needed. ibuprofen 200 mg cap Take 200 mg by mouth as needed. methylphenidate ER 36 mg 24 hr tab Take 2 Tablets by mouth every morning. Max Daily Amount: 72 mg. 60 Tablet 0    Cetirizine (ZYRTEC) 10 mg cap Take 10 mg PE by mouth as needed. calcium carbonate (TUMS EXTRA STRENGTH SMOOTHIES) 300 mg (750 mg) chewable tablet Take 1 Tablet by mouth as needed.           Objective     Patient-Reported Vitals 11/1/2022   Patient-Reported Weight 230   Patient-Reported Height -   Patient-Reported Pulse 72   Patient-Reported Temperature 98.1      eneral: alert, cooperative, no distress   Mental  status: mental status: alert, oriented to person, place, and time, normal mood, behavior, speech, dress, motor activity, and thought processes   Resp: resp: normal effort and no respiratory distress   Neuro: neuro: no gross deficits   Skin: skin: no discoloration or lesions of concern on visible areas   Due to this being a TeleHealth evaluation, many elements of the physical examination are unable to be assessed. Assessment and Plan:   1. Attention deficit disorder (ADD) without hyperactivity  Stable on current dose. Had some jittery feelings when restarting after the summer. This has resolved. Takes the weekend off typically and will take off for winter break. When restarting, will ramp up with 1 tab daily for a couple days before going to his 2 tablets. - methylphenidate ER 36 mg 24 hr tab; Take 2 Tablets by mouth daily for 30 days. Max Daily Amount: 72 mg. Dispense: 60 Tablet; Refill: 0  - methylphenidate ER 36 mg 24 hr tab; Take 2 Tablets by mouth daily for 30 days. Max Daily Amount: 72 mg. Dispense: 60 Tablet; Refill: 0  - methylphenidate ER 36 mg 24 hr tab; Take 2 Tablets by mouth daily for 30 days. Max Daily Amount: 72 mg. Dispense: 60 Tablet; Refill: 0    We discussed the expected course, resolution and complications of the diagnosis(es) in detail. Medication risks, benefits, costs, interactions, and alternatives were discussed as indicated. I advised him to contact the office if his condition worsens, changes or fails to improve as anticipated. He expressed understanding with the diagnosis(es) and plan. Patient understands that this encounter was a temporary measure, and the importance of further follow up and examination was emphasized. Patient verbalized understanding. Patient informed to follow up: 3 months.     Electronically Signed: Jacob Rosenthal MD       Current Medications/Allergies   Medications and Allergies reviewed:    Current Outpatient Medications   Medication Sig Dispense Refill    fluticasone propionate (FLONASE) 50 mcg/actuation nasal spray 2 Sprays by Both Nostrils route as needed. bismuth subsalicylate (PEPTO-BISMOL) 262 mg chew Take 2 Tablets by mouth as needed. ibuprofen 200 mg cap Take 200 mg by mouth as needed. methylphenidate ER 36 mg 24 hr tab Take 2 Tablets by mouth every morning. Max Daily Amount: 72 mg. 60 Tablet 0    Cetirizine (ZYRTEC) 10 mg cap Take 10 mg PE by mouth as needed. calcium carbonate (TUMS EXTRA STRENGTH SMOOTHIES) 300 mg (750 mg) chewable tablet Take 1 Tablet by mouth as needed.        No Known Allergies

## 2023-01-01 NOTE — DISCHARGE SUMMARY
Discharge Summary    Patient: Jolanta Don               Sex: male          DOA: 1/21/2022  8:26 AM       YOB: 1990      Age:  33 y.o.        LOS:  LOS: 0 days                Discharge Date:  1/21/2022    Admission Diagnoses: UMBLICAL HERNIA    Discharge Diagnoses:  Same    Procedure:  Procedure(s):  ROBOTIC ASSISTED UMBILICAL AND BILATERAL INGUINAL HERNIA REPAIR WITH MESH    Discharge Condition: Good    Hospital Course: Unremarkable operative procedure. Discharge to home in stable condition. Consults: None    Significant Diagnostic Studies: See full electronic record. Discharge Medications:   See full electronic record    Activity/Diet/Wound Care: See patient administered discharge instructions.     Follow-up: 2 weeks    Alisa Monroy MD  [facility]  Office:  [phone]  Fax:  [phone] -Limit visiting for 8 weeks and avoid public places.

## 2023-02-28 ENCOUNTER — VIRTUAL VISIT (OUTPATIENT)
Dept: FAMILY MEDICINE CLINIC | Age: 33
End: 2023-02-28
Payer: COMMERCIAL

## 2023-02-28 DIAGNOSIS — F98.8 ATTENTION DEFICIT DISORDER (ADD) WITHOUT HYPERACTIVITY: ICD-10-CM

## 2023-02-28 PROCEDURE — 99213 OFFICE O/P EST LOW 20 MIN: CPT | Performed by: FAMILY MEDICINE

## 2023-02-28 RX ORDER — METHYLPHENIDATE HYDROCHLORIDE 36 MG/1
72 TABLET ORAL DAILY
Qty: 60 TABLET | Refills: 0 | Status: SHIPPED | OUTPATIENT
Start: 2023-03-30 | End: 2023-04-29

## 2023-02-28 RX ORDER — METHYLPHENIDATE HYDROCHLORIDE 36 MG/1
72 TABLET ORAL DAILY
Qty: 60 TABLET | Refills: 0 | Status: SHIPPED | OUTPATIENT
Start: 2023-02-28 | End: 2023-03-30

## 2023-02-28 RX ORDER — METHYLPHENIDATE HYDROCHLORIDE 36 MG/1
72 TABLET ORAL DAILY
Qty: 60 TABLET | Refills: 0 | Status: SHIPPED | OUTPATIENT
Start: 2023-04-29 | End: 2023-05-29

## 2023-02-28 NOTE — PROGRESS NOTES
Ruy Anderson  27 y.o. male  1990  [facility] [address]  [id number]   [address]:    Telemedicine Progress Note  Ifeoma Wright MD       Encounter Date and Time: 2023 at 9:39 AM    uRy Anderson, was evaluated through a synchronous (real-time) audio-video encounter. The patient (or guardian if applicable) is aware that this is a billable service, which includes applicable co-pays. This Virtual Visit was conducted with patient's (and/or legal guardian's) consent. The visit was conducted pursuant to the emergency declaration under the [facility], [facility] authority and the [employer] and [employer] General Act. Patient identification was verified, and a caregiver was present when appropriate. The patient was located at: Other: Work  The provider was located at: Facility (Appt Department): [address] [place] [address]    --Ifeoma Wright MD on 2023 at 9:39 AM    Chief Complaint   Patient presents with    Attention Deficit Disorder     Subjective   Ruy Anderson is a 27 y.o. male who presents to clinic today for ADHD Follow-up. ADHD Medication compliance: weekends and school holidays off    Patient's perception of whether/to what extent meds are effective: Remain effective  New Stressors? Tough month. Grandmother  over the weekend. Health cares with family members. 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 If there is counseling or mental health involvement, is pt attending sessions? Effective? N/a      Growth  Wt Readings from Last 3 Encounters:   22 227 lb (103 kg)   22 227 lb 15.3 oz (103.4 kg)   21 224 lb (101.6 kg)     There is no height or weight on file to calculate BMI. No height and weight on file for this encounter. Facility age limit for growth percentiles is 20 years.   Facility age limit for growth percentiles is 20 years. ROS:  Appetite: Normal  Sleep: Normal  Headache: None  Stomachache: None  Tics: None  Picking: None  More Emotional: No  Dull/listless: No  Irritable: No   Socially withdrawn: No  Other: none       Objective     Patient-Reported Vitals 2/28/2023   Patient-Reported Weight 224   Patient-Reported Height -   Patient-Reported Pulse 80   Patient-Reported Temperature 97.6      General: alert, cooperative, no distress   Mental  status: mental status: alert, oriented to person, place, and time, normal mood, behavior, speech, dress, motor activity, and thought processes   Resp: resp: normal effort and no respiratory distress   Neuro: neuro: no gross deficits   Skin: skin: no discoloration or lesions of concern on visible areas   Due to this being a TeleHealth evaluation, many elements of the physical examination are unable to be assessed. 1. Attention deficit disorder (ADD) without hyperactivity  Doing well with current dose. No side-effects. Will continue without medication changes. Encourage regular exercise and a balanced diet. Will send me a [employer] message with his updated blood pressure.   - methylphenidate ER 36 mg 24 hr tab; Take 2 Tablets by mouth daily for 30 days. Max Daily Amount: 72 mg. Dispense: 60 Tablet; Refill: 0  - methylphenidate ER 36 mg 24 hr tab; Take 2 Tablets by mouth daily for 30 days. Max Daily Amount: 72 mg. Dispense: 60 Tablet; Refill: 0  - methylphenidate ER 36 mg 24 hr tab; Take 2 Tablets by mouth daily for 30 days. Max Daily Amount: 72 mg. Dispense: 60 Tablet; Refill: 0      I have discussed the diagnosis with the patient and the intended plan as seen in the above orders. he has expressed understanding. The patient has received an after-visit summary and questions were answered concerning future plans. I have discussed medication side effects and warnings with the patient as well.     Follow-up and Dispositions    Return in about 3 months (around 5/28/2023) for ADHD Follow-up. Electronically Signed: Luiz Maya MD       History   Patients past medical, surgical and family histories were reviewed and updated. Past Medical History:   Diagnosis Date    ADHD (attention deficit hyperactivity disorder) 1997    neuro-evaluation done. on concerta. 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    GERD (gastroesophageal reflux disease)     Psychiatric disorder     ADHD     Past Surgical History:   Procedure Laterality Date    HX ORTHOPAEDIC  2016    herniated disc     HX WISDOM TEETH EXTRACTION      HX WISDOM TEETH EXTRACTION  2014     Family History   Problem Relation Age of Onset    Tremors Mother     OSTEOARTHRITIS Mother     Cancer Mother     Cancer Father     Lung Disease Father     Elevated Lipids Maternal Grandmother     Diabetes Maternal Grandmother     OSTEOARTHRITIS Maternal Grandmother     Stroke Maternal Grandfather     OSTEOARTHRITIS Paternal Grandmother     Elevated Lipids Paternal Grandfather      Social History     Tobacco Use    Smoking status: Never    Smokeless tobacco: Never   Vaping Use    Vaping Use: Never used   Substance Use Topics    Alcohol use: Yes     Comment: A couple beers a week    Drug use: Never     Patient Active Problem List   Diagnosis Code    ADD (attention deficit disorder) F98.8    FH: spinal stenosis Z82.69    DDD (degenerative disc disease), lumbar M51.36    Inflamed skin tag L91.8          Current Medications/Allergies   Medications and Allergies reviewed:    Current Outpatient Medications   Medication Sig Dispense Refill    fluticasone propionate (FLONASE) 50 mcg/actuation nasal spray 2 Sprays by Both Nostrils route as needed. bismuth subsalicylate (PEPTO-BISMOL) 262 mg chew Take 2 Tablets by mouth as needed. ibuprofen 200 mg cap Take 200 mg by mouth as needed. methylphenidate ER 36 mg 24 hr tab Take 2 Tablets by mouth every morning.  Max Daily Amount: 72 mg. 60 Tablet 0    Cetirizine (ZYRTEC) 10 mg cap Take 10 mg PE by mouth as needed. calcium carbonate (TUMS EXTRA STRENGTH SMOOTHIES) 300 mg (750 mg) chewable tablet Take 1 Tablet by mouth as needed.        No Known Allergies

## 2023-05-24 RX ORDER — OMEGA-3 FATTY ACIDS/FISH OIL 300-1000MG
200 CAPSULE ORAL PRN
COMMUNITY

## 2023-05-24 RX ORDER — FLUTICASONE PROPIONATE 50 MCG
2 SPRAY, SUSPENSION (ML) NASAL PRN
COMMUNITY

## 2023-05-24 RX ORDER — BISMUTH SUBSALICYLATE 262 MG/1
2 TABLET, CHEWABLE ORAL PRN
COMMUNITY

## 2023-05-24 RX ORDER — CALCIUM CARBONATE 750 MG/1
1 TABLET, CHEWABLE ORAL PRN
COMMUNITY

## 2023-05-24 RX ORDER — METHYLPHENIDATE HYDROCHLORIDE 36 MG/1
72 TABLET, EXTENDED RELEASE ORAL DAILY
Qty: 60 TABLET | Refills: 17 | COMMUNITY
Start: 2021-12-20 | End: 2023-05-29

## 2023-08-28 SDOH — ECONOMIC STABILITY: TRANSPORTATION INSECURITY
IN THE PAST 12 MONTHS, HAS LACK OF TRANSPORTATION KEPT YOU FROM MEETINGS, WORK, OR FROM GETTING THINGS NEEDED FOR DAILY LIVING?: NO

## 2023-08-28 SDOH — ECONOMIC STABILITY: FOOD INSECURITY: WITHIN THE PAST 12 MONTHS, THE FOOD YOU BOUGHT JUST DIDN'T LAST AND YOU DIDN'T HAVE MONEY TO GET MORE.: NEVER TRUE

## 2023-08-28 SDOH — ECONOMIC STABILITY: FOOD INSECURITY: WITHIN THE PAST 12 MONTHS, YOU WORRIED THAT YOUR FOOD WOULD RUN OUT BEFORE YOU GOT MONEY TO BUY MORE.: NEVER TRUE

## 2023-08-28 SDOH — ECONOMIC STABILITY: HOUSING INSECURITY
IN THE LAST 12 MONTHS, WAS THERE A TIME WHEN YOU DID NOT HAVE A STEADY PLACE TO SLEEP OR SLEPT IN A SHELTER (INCLUDING NOW)?: NO

## 2023-08-28 SDOH — ECONOMIC STABILITY: INCOME INSECURITY: HOW HARD IS IT FOR YOU TO PAY FOR THE VERY BASICS LIKE FOOD, HOUSING, MEDICAL CARE, AND HEATING?: NOT HARD AT ALL

## 2023-08-29 ENCOUNTER — TELEMEDICINE (OUTPATIENT)
Age: 33
End: 2023-08-29
Payer: COMMERCIAL

## 2023-08-29 DIAGNOSIS — F98.8 ATTENTION DEFICIT DISORDER (ADD) WITHOUT HYPERACTIVITY: Primary | ICD-10-CM

## 2023-08-29 PROCEDURE — 99213 OFFICE O/P EST LOW 20 MIN: CPT | Performed by: FAMILY MEDICINE

## 2023-08-29 RX ORDER — METHYLPHENIDATE HYDROCHLORIDE 36 MG/1
72 TABLET ORAL DAILY
Qty: 60 TABLET | Refills: 0 | Status: SHIPPED | OUTPATIENT
Start: 2023-08-29 | End: 2023-09-28

## 2023-08-29 RX ORDER — METHYLPHENIDATE HYDROCHLORIDE 36 MG/1
72 TABLET ORAL DAILY
Qty: 60 TABLET | Refills: 0 | Status: SHIPPED | OUTPATIENT
Start: 2023-10-28 | End: 2023-11-27

## 2023-08-29 RX ORDER — METHYLPHENIDATE HYDROCHLORIDE 36 MG/1
72 TABLET ORAL DAILY
Qty: 60 TABLET | Refills: 0 | Status: SHIPPED | OUTPATIENT
Start: 2023-09-28 | End: 2023-10-28

## 2023-08-29 NOTE — PROGRESS NOTES
Danae Gaytan  26 y.o. male  1990  [address] [place] [address]  [place] [address]  [id number]   [address] [facility] [address]:    Telemedicine Progress Note  Beatriz Gerardo MD       Encounter Date and Time: August 29, 2023 at 4:19 PM    Danae Gaytan, was evaluated through a synchronous (real-time) audio-video encounter. The patient (or guardian if applicable) is aware that this is a billable service, which includes applicable co-pays. This Virtual Visit was conducted with patient's (and/or legal guardian's) consent. Patient identification was verified, and a caregiver was present when appropriate. The patient was located at Other: Work in [employer] was located at [facility] ([address] [place] [address]): [address] [place] [address]. [place],  [facility]    STOP! Confirm you are appropriately licensed, registered, or certified to deliver care in the state where the patient is located as indicated above. If you are not or unsure, please re-schedule the visit. Are you appropriately licensed in the patient's state?: Yes    Chief Complaint   Patient presents with    ADHD            History of Present Illness   Danae Gaytan is a 26 y.o. male was evaluated by synchronous (real-time) audio-video technology from home, through a secure patient portal.  For follow-up on ADHD: Overall doing well. Is currently taking medication regularly. 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 Denies side effects with medication. Appetite is good. Sleep is good. Has not noticed significant weight. Denies abdominal pain or chest pain. Review of Systems   Review of Systems   All other systems reviewed and are negative.     Vitals/Objective:     General: alert, cooperative, and no distress   Mental  status: mental status: alert, oriented to person, place, and time, normal mood, behavior, speech, dress, motor activity, and thought processes   Resp: No evidence of respiratory

## 2024-01-23 ENCOUNTER — TELEPHONE (OUTPATIENT)
Age: 34
End: 2024-01-23

## 2024-01-23 DIAGNOSIS — F98.8 ATTENTION DEFICIT DISORDER (ADD) WITHOUT HYPERACTIVITY: ICD-10-CM

## 2024-01-23 NOTE — TELEPHONE ENCOUNTER
----- Message from Preetpop Hester sent at 1/19/2024 12:32 PM EST -----  Subject: Refill Request    QUESTIONS  Name of Medication? methylphenidate (CONCERTA) 36 MG extended release   tablet  Patient-reported dosage and instructions? 2 tablets 1 x daily   How many days do you have left? 11  Preferred Pharmacy? [id number]  Pharmacy phone number (if available)? [phone]  Additional Information for Provider? scheduling a appt .  ---------------------------------------------------------------------------  --------------  CALL BACK INFO  What is the best way for the office to contact you? OK to leave message on   voicemail  Preferred Call Back Phone Number? [phone]  ---------------------------------------------------------------------------  --------------  SCRIPT ANSWERS  Relationship to Patient? Self

## 2024-01-25 RX ORDER — METHYLPHENIDATE HYDROCHLORIDE 36 MG/1
72 TABLET ORAL DAILY
Qty: 60 TABLET | Refills: 0 | Status: SHIPPED | OUTPATIENT
Start: 2024-01-25 | End: 2024-02-24

## 2024-01-29 ENCOUNTER — TELEMEDICINE (OUTPATIENT)
Age: 34
End: 2024-01-29
Payer: COMMERCIAL

## 2024-01-29 DIAGNOSIS — F98.8 ATTENTION DEFICIT DISORDER (ADD) WITHOUT HYPERACTIVITY: ICD-10-CM

## 2024-01-29 PROCEDURE — 99213 OFFICE O/P EST LOW 20 MIN: CPT | Performed by: FAMILY MEDICINE

## 2024-01-29 RX ORDER — METHYLPHENIDATE HYDROCHLORIDE 36 MG/1
72 TABLET ORAL DAILY
Qty: 60 TABLET | Refills: 0 | Status: SHIPPED | OUTPATIENT
Start: 2024-03-27 | End: 2024-04-26

## 2024-01-29 RX ORDER — METHYLPHENIDATE HYDROCHLORIDE 36 MG/1
72 TABLET ORAL DAILY
Qty: 60 TABLET | Refills: 0 | Status: SHIPPED | OUTPATIENT
Start: 2024-02-26 | End: 2024-03-27

## 2024-01-29 NOTE — PROGRESS NOTES
Dave Ruth  33 y.o. male  1990  [address] [place] [address]  [id number]   [facility]:    Telemedicine Progress Note  Cong Lou MD       Encounter Date and Time: January 29, 2024 at 12:01 PM    Dave Ruth, was evaluated through a synchronous (real-time) audio-video encounter. The patient (or guardian if applicable) is aware that this is a billable service, which includes applicable co-pays. This Virtual Visit was conducted with patient's (and/or legal guardian's) consent. Patient identification was verified, and a caregiver was present when appropriate.   The patient was located at Other: [place], [place]  Provider was located at Facility (Appt Dept): [address] [place] [phone]      No chief complaint on file.    History of Present Illness   Dave Ruth is a 33 y.o. male was evaluated by synchronous (real-time) audio-video technology from home, through a secure patient portal.  Following up for ADHD.  Overall he is doing well with the Concerta.  Denies any significant side effects. He is still able to perform his tasks at home and at work while on the medication.  Took a medication holiday over the Christmas break and noticed significantly more attention issues while off of the medication.      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     Review of Systems   Review of Systems   Constitutional:  Negative for unexpected weight change.   Cardiovascular:  Negative for palpitations.   Neurological:  Negative for dizziness, light-headedness and headaches.   Psychiatric/Behavioral:  Negative for sleep disturbance.          Vitals/Objective:         1/28/2024    12:34 PM   Patient-Reported Vitals   Patient-Reported Weight 230 lb   Patient-Reported Height 5'11\"   Patient-Reported Pulse 76   Patient-Reported Temperature 98        General: alert, cooperative, and no distress   Mental  status: mental status:

## 2024-08-07 ENCOUNTER — OFFICE VISIT (OUTPATIENT)
Age: 34
End: 2024-08-07
Payer: COMMERCIAL

## 2024-08-07 VITALS
HEIGHT: 71 IN | SYSTOLIC BLOOD PRESSURE: 136 MMHG | WEIGHT: 239 LBS | OXYGEN SATURATION: 97 % | DIASTOLIC BLOOD PRESSURE: 88 MMHG | RESPIRATION RATE: 18 BRPM | HEART RATE: 94 BPM | BODY MASS INDEX: 33.46 KG/M2 | TEMPERATURE: 97.8 F

## 2024-08-07 DIAGNOSIS — F98.8 ATTENTION DEFICIT DISORDER (ADD) WITHOUT HYPERACTIVITY: Primary | ICD-10-CM

## 2024-08-07 PROCEDURE — 99213 OFFICE O/P EST LOW 20 MIN: CPT | Performed by: FAMILY MEDICINE

## 2024-08-07 RX ORDER — METHYLPHENIDATE HYDROCHLORIDE 36 MG/1
72 TABLET ORAL DAILY
Qty: 60 TABLET | Refills: 0 | Status: SHIPPED | OUTPATIENT
Start: 2024-09-06 | End: 2024-10-06

## 2024-08-07 RX ORDER — METHYLPHENIDATE HYDROCHLORIDE 36 MG/1
72 TABLET ORAL DAILY
Qty: 60 TABLET | Refills: 0 | Status: SHIPPED | OUTPATIENT
Start: 2024-08-07 | End: 2024-09-06

## 2024-08-07 RX ORDER — METHYLPHENIDATE HYDROCHLORIDE 36 MG/1
72 TABLET ORAL DAILY
Qty: 60 TABLET | Refills: 0 | Status: SHIPPED | OUTPATIENT
Start: 2024-10-06 | End: 2024-11-05

## 2024-08-07 ASSESSMENT — PATIENT HEALTH QUESTIONNAIRE - PHQ9
1. LITTLE INTEREST OR PLEASURE IN DOING THINGS: NOT AT ALL
SUM OF ALL RESPONSES TO PHQ QUESTIONS 1-9: 0
SUM OF ALL RESPONSES TO PHQ9 QUESTIONS 1 & 2: 0
SUM OF ALL RESPONSES TO PHQ QUESTIONS 1-9: 0
2. FEELING DOWN, DEPRESSED OR HOPELESS: NOT AT ALL

## 2024-08-07 NOTE — PROGRESS NOTES
[facility]: Progress Note  Cong Lou MD  8/7/2024   Chief Complaint   Patient presents with    Medication Refill        Subjective   Dave Ruth is a 34 y.o. male who presents to clinic today for ADHD Follow-up.    ADHD Medication compliance:  Has taken off over the summer.  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     Patient's perception of whether/to what extent meds are effective: Effective  New Stressors? None.  Has gained weight since last visit.  Is rededicating himself to improved diet and movement.   Also notes some new dietary intolerance to beef.  If there is counseling or mental health involvement, is pt attending sessions? Effective? N/A      Growth  Wt Readings from Last 3 Encounters:   08/07/24 108.4 kg (239 lb)   12/20/21 101.6 kg (224 lb)   10/22/21 102.4 kg (225 lb 12.8 oz)     Body mass index is 33.33 kg/m².  Facility age limit for growth %delia is 20 years.  Facility age limit for growth %delia is 20 years.  Facility age limit for growth %delia is 20 years.   ROS:  Appetite: Normal  Sleep: Normal  Headache: None  Stomachache: None  Tics: None  Picking: None  More Emotional: No  Dull/listless: No  Irritable: No   Socially withdrawn: No  Other: None     Patients past medical history and social history reviewed:  Past Medical History:   Diagnosis Date    ADHD (attention deficit hyperactivity disorder) 1997    neuro-evaluation done. on concerta.     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  Current Outpatient Medications   Medication Sig Dispense Refill    [START ON

## 2024-08-07 NOTE — PROGRESS NOTES
Chief Complaint   Patient presents with    Medication Refill     Vitals:    08/07/24 1310   BP: 136/88   Site: Right Upper Arm   Position: Sitting   Cuff Size: Large Adult   Pulse: 94   Resp: 18   Temp: 97.8 °F (36.6 °C)   TempSrc: Temporal   SpO2: 97%   Weight: 108.4 kg (239 lb)   Height: 1.803 m (5' 11\")     \"Have you been to the ER, urgent care clinic since your last visit?  Hospitalized since your last visit?\"    NO    “Have you seen or consulted any other health care providers outside of [facility] since your last visit?”    NO            Click Here for Release of Records Request

## 2025-01-20 SDOH — ECONOMIC STABILITY: INCOME INSECURITY: IN THE LAST 12 MONTHS, WAS THERE A TIME WHEN YOU WERE NOT ABLE TO PAY THE MORTGAGE OR RENT ON TIME?: NO

## 2025-01-20 SDOH — ECONOMIC STABILITY: FOOD INSECURITY: WITHIN THE PAST 12 MONTHS, THE FOOD YOU BOUGHT JUST DIDN'T LAST AND YOU DIDN'T HAVE MONEY TO GET MORE.: NEVER TRUE

## 2025-01-20 SDOH — ECONOMIC STABILITY: FOOD INSECURITY: WITHIN THE PAST 12 MONTHS, YOU WORRIED THAT YOUR FOOD WOULD RUN OUT BEFORE YOU GOT MONEY TO BUY MORE.: NEVER TRUE

## 2025-01-20 SDOH — ECONOMIC STABILITY: TRANSPORTATION INSECURITY
IN THE PAST 12 MONTHS, HAS THE LACK OF TRANSPORTATION KEPT YOU FROM MEDICAL APPOINTMENTS OR FROM GETTING MEDICATIONS?: NO

## 2025-01-21 ENCOUNTER — OFFICE VISIT (OUTPATIENT)
Age: 35
End: 2025-01-21
Payer: COMMERCIAL

## 2025-01-21 VITALS
OXYGEN SATURATION: 96 % | TEMPERATURE: 97.8 F | HEART RATE: 126 BPM | SYSTOLIC BLOOD PRESSURE: 123 MMHG | DIASTOLIC BLOOD PRESSURE: 89 MMHG | BODY MASS INDEX: 33.88 KG/M2 | WEIGHT: 242 LBS | HEIGHT: 71 IN | RESPIRATION RATE: 18 BRPM

## 2025-01-21 DIAGNOSIS — F98.8 ATTENTION DEFICIT DISORDER (ADD) WITHOUT HYPERACTIVITY: ICD-10-CM

## 2025-01-21 PROCEDURE — 99213 OFFICE O/P EST LOW 20 MIN: CPT | Performed by: FAMILY MEDICINE

## 2025-01-21 RX ORDER — METHYLPHENIDATE HYDROCHLORIDE 36 MG/1
72 TABLET ORAL DAILY
Qty: 60 TABLET | Refills: 0 | Status: SHIPPED | OUTPATIENT
Start: 2025-02-20 | End: 2025-03-22

## 2025-01-21 RX ORDER — METHYLPHENIDATE HYDROCHLORIDE 36 MG/1
72 TABLET ORAL DAILY
Qty: 60 TABLET | Refills: 0 | Status: SHIPPED | OUTPATIENT
Start: 2025-01-21 | End: 2025-02-20

## 2025-01-21 RX ORDER — METHYLPHENIDATE HYDROCHLORIDE 36 MG/1
72 TABLET ORAL DAILY
Qty: 60 TABLET | Refills: 0 | Status: SHIPPED | OUTPATIENT
Start: 2025-03-22 | End: 2025-04-21

## 2025-01-21 ASSESSMENT — PATIENT HEALTH QUESTIONNAIRE - PHQ9
SUM OF ALL RESPONSES TO PHQ QUESTIONS 1-9: 0
2. FEELING DOWN, DEPRESSED OR HOPELESS: NOT AT ALL
SUM OF ALL RESPONSES TO PHQ9 QUESTIONS 1 & 2: 0
1. LITTLE INTEREST OR PLEASURE IN DOING THINGS: NOT AT ALL

## 2025-01-21 NOTE — PROGRESS NOTES
[facility]: Progress Note  Cong Lou MD  1/21/2025   Chief Complaint   Patient presents with    ADD     Follow-up        Subjective   Dave Ruth is a 34 y.o. male who presents to clinic today for ADHD Follow-up.    ADHD Medication compliance:  Taking daily. Overall remains effective.    Patient's perception of whether/to what extent meds are effective: Effective  New Stressors? None.  Has gained weight since last visit.  Is rededicating himself to improved diet and movement.   Also notes some new dietary intolerance to beef.  If there is counseling or mental health involvement, is pt attending sessions? Effective? N/A      Growth  Wt Readings from Last 3 Encounters:   01/21/25 109.8 kg (242 lb)   08/07/24 108.4 kg (239 lb)   12/20/21 101.6 kg (224 lb)     Body mass index is 33.75 kg/m².  Facility age limit for growth %delia is 20 years.  Facility age limit for growth %delia is 20 years.  Facility age limit for growth %delia is 20 years.   ROS:  Appetite: Normal  Sleep: Normal  Headache: None  Stomachache: None  Tics: None  Picking: None  More Emotional: No  Dull/listless: No  Irritable: No   Socially withdrawn: No  Other: None     Patients past medical history and social history reviewed:  Past Medical History:   Diagnosis Date    ADHD (attention deficit hyperactivity disorder) 1997    neuro-evaluation done. on concerta.     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  Current Outpatient Medications   Medication Sig Dispense Refill    bismuth subsalicylate (PEPTO BISMOL) 262 MG chewable tablet

## 2025-01-21 NOTE — PROGRESS NOTES
Chief Complaint   Patient presents with    ADD     Follow-up     Vitals:    01/21/25 1335   BP: 123/89   Site: Right Upper Arm   Position: Sitting   Cuff Size: Large Adult   Pulse: (!) 126   Resp: 18   Temp: 97.8 °F (36.6 °C)   TempSrc: Temporal   SpO2: 96%   Weight: 109.8 kg (242 lb)   Height: 1.803 m (5' 11\")     \"Have you been to the ER, urgent care clinic since your last visit?  Hospitalized since your last visit?\"    [facility] on 1/15/2025 for left shoulder.     “Have you seen or consulted any other health care providers outside of [facility] since your last visit?”    NO            Click Here for Release of Records Request

## 2025-01-24 LAB
AMPHETAMINES UR QL SCN: NEGATIVE NG/ML
BARBITURATES UR QL SCN: NEGATIVE NG/ML
BENZODIAZ UR QL: NEGATIVE NG/ML
BZE UR QL: NEGATIVE NG/ML
CANNABINOIDS UR QL SCN: NEGATIVE NG/ML
OPIATES UR QL: NEGATIVE NG/ML
PCP UR QL SCN: NEGATIVE NG/ML

## 2025-06-25 ENCOUNTER — OFFICE VISIT (OUTPATIENT)
Age: 35
End: 2025-06-25
Payer: COMMERCIAL

## 2025-06-25 VITALS
OXYGEN SATURATION: 95 % | RESPIRATION RATE: 18 BRPM | TEMPERATURE: 98.1 F | SYSTOLIC BLOOD PRESSURE: 108 MMHG | BODY MASS INDEX: 34.16 KG/M2 | WEIGHT: 244 LBS | HEART RATE: 92 BPM | HEIGHT: 71 IN | DIASTOLIC BLOOD PRESSURE: 81 MMHG

## 2025-06-25 DIAGNOSIS — E66.09 CLASS 1 OBESITY DUE TO EXCESS CALORIES WITHOUT SERIOUS COMORBIDITY WITH BODY MASS INDEX (BMI) OF 34.0 TO 34.9 IN ADULT: ICD-10-CM

## 2025-06-25 DIAGNOSIS — M51.360 DEGENERATION OF INTERVERTEBRAL DISC OF LUMBAR REGION WITH DISCOGENIC BACK PAIN: ICD-10-CM

## 2025-06-25 DIAGNOSIS — E66.811 CLASS 1 OBESITY DUE TO EXCESS CALORIES WITHOUT SERIOUS COMORBIDITY WITH BODY MASS INDEX (BMI) OF 34.0 TO 34.9 IN ADULT: ICD-10-CM

## 2025-06-25 DIAGNOSIS — F98.8 ATTENTION DEFICIT DISORDER (ADD) WITHOUT HYPERACTIVITY: Primary | ICD-10-CM

## 2025-06-25 PROCEDURE — 99213 OFFICE O/P EST LOW 20 MIN: CPT | Performed by: FAMILY MEDICINE

## 2025-06-25 RX ORDER — METHYLPHENIDATE HYDROCHLORIDE 36 MG/1
72 TABLET ORAL DAILY
Qty: 60 TABLET | Refills: 0 | Status: SHIPPED | OUTPATIENT
Start: 2025-06-25 | End: 2025-07-25

## 2025-06-25 NOTE — PROGRESS NOTES
Identified pt with two pt identifiers(name and ). Reviewed record in preparation for visit and have obtained necessary documentation.  Chief Complaint   Patient presents with    Medication Refill     Pt reports for Med refill & next PCP        Health Maintenance Due   Topic    Varicella vaccine (1 of 2 - 13+ 2-dose series)    HIV screen     Hepatitis C screen     Hepatitis B vaccine (1 of 3 - 19+ 3-dose series)    Diabetes screen     COVID-19 Vaccine ( season)       Vitals:    25 1553   BP: 108/81   BP Site: Left Upper Arm   Patient Position: Sitting   BP Cuff Size: Large Adult   Pulse: 92   Resp: 18   Temp: 98.1 °F (36.7 °C)   TempSrc: Oral   SpO2: 95%   Weight: 110.7 kg (244 lb)   Height: 1.803 m (5' 11\")         \"Have you been to the ER, urgent care clinic since your last visit?  Hospitalized since your last visit?\"    NO    “Have you seen or consulted any other health care providers outside of [facility] since your last visit?”    NO      Click Here for Release of Records Request     This patient is accompanied in the office by his self.  I have received verbal consent from Dave Ruth to discuss any/all medical information while they are present in the room.

## 2025-06-25 NOTE — PROGRESS NOTES
Dave Ruth  35 y.o. male  1990  [address]   [facility] [address]  [id number]   [facility]: Progress Note  Cong Lou MD       Encounter Date: 6/25/2025    Chief Complaint   Patient presents with    Medication Refill     Pt reports for Med refill & next PCP     History of Present Illness   Dave Ruth is a 35 y.o. male who presents to clinic today for follow-up on ADHD.  Overall he notes he is doing well, though is having some low back pain that limits his ability to do more arduous physical tasks.  No radiculopathy and is managed with OTC NSAIDS.    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  He is able to remain focused, is a better communicator, and is less distractible.  Denies side effects, including insomnia, poor appetite, weight loss, anxiety/headaches.  Currently on a medication holiday over the summer.       Spent some time discussing weight.  Has been making efforts to make healthier choices.  Has changed to healthier sodas.    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    Physical Exam  Constitutional:       General: He is not in acute distress.     Appearance: He is obese.   Cardiovascular:      Rate and Rhythm: Normal rate.      Pulses: Normal pulses.      Heart sounds: Normal heart sounds.   Abdominal:      Palpations: Abdomen is soft.   Neurological:      General: No focal deficit present.      Mental Status: He is alert and oriented to person, place, and time.      Deep Tendon Reflexes: Reflexes normal.   Psychiatric:         Mood and

## (undated) DEVICE — ELECTRO LUBE IS A SINGLE PATIENT USE DEVICE THAT IS INTENDED TO BE USED ON ELECTROSURGICAL ELECTRODES TO REDUCE STICKING.: Brand: KEY SURGICAL ELECTRO LUBE

## (undated) DEVICE — AIRSEAL BIFURCATED SMOKE EVAC FILTERED TUBE SET: Brand: AIRSEAL

## (undated) DEVICE — SUTURE DEV SZ 2-0 WND CLSR ABSRB GS-22 VLOC COVIDIEN VLOCM2145

## (undated) DEVICE — SUTURE ABSRB L12IN L12MM SZ 2-0 GS-22 VLT GLYCOLIDE VLOCM2115

## (undated) DEVICE — BINDER ABD S/M 12X30-45IN --

## (undated) DEVICE — SOL IRRIGATION INJ NACL 0.9% 500ML BTL

## (undated) DEVICE — DEVICE TRNSF SPIK STL 2008S] MICROTEK MEDICAL INC]

## (undated) DEVICE — GLOVE ORTHO 8   MSG9480

## (undated) DEVICE — BLADELESS OBTURATOR: Brand: WECK VISTA

## (undated) DEVICE — SUTURE 1 STRATAFIX SYMMETRIC PDS + 30CM CT-1 SXPP1A435

## (undated) DEVICE — LAPAROSCOPIC TROCAR SLEEVE/SINGLE USE: Brand: KII® OPTICAL ACCESS SYSTEM

## (undated) DEVICE — STRIP,CLOSURE,WOUND,MEDI-STRIP,1/2X4: Brand: MEDLINE

## (undated) DEVICE — 3M™ TEGADERM™ TRANSPARENT FILM DRESSING FRAME STYLE, 1624W, 2-3/8 IN X 2-3/4 IN (6 CM X 7 CM), 100/CT 4CT/CASE: Brand: 3M™ TEGADERM™

## (undated) DEVICE — ROBOTIC GENERAL-SFMC: Brand: MEDLINE INDUSTRIES, INC.

## (undated) DEVICE — COVER MPLR TIP CRV SCIS ACC DA VINCI

## (undated) DEVICE — PAD,NON-ADHERENT,3X8,STERILE,LF,1/PK: Brand: MEDLINE

## (undated) DEVICE — SEAL UNIV 5-8MM DISP BX/10 -- DA VINCI XI - SNGL USE

## (undated) DEVICE — SUTURE MCRYL SZ 4-0 L27IN ABSRB UD L19MM PS-2 1/2 CIR PRIM Y426H

## (undated) DEVICE — NDL SPNE QNCKE 18GX3.5IN LF --

## (undated) DEVICE — ARM DRAPE

## (undated) DEVICE — SUTURE VCRL SZ 0 L27IN ABSRB VLT L36MM CT-1 1/2 CIR J340H

## (undated) DEVICE — SUTURE PDS II SZ 0 L27IN ABSRB VLT L36MM CT-1 1/2 CIR Z340H